# Patient Record
Sex: MALE | Race: WHITE | NOT HISPANIC OR LATINO | Employment: OTHER | URBAN - METROPOLITAN AREA
[De-identification: names, ages, dates, MRNs, and addresses within clinical notes are randomized per-mention and may not be internally consistent; named-entity substitution may affect disease eponyms.]

---

## 2017-01-05 DIAGNOSIS — I10 ESSENTIAL HYPERTENSION: ICD-10-CM

## 2017-01-05 RX ORDER — IRBESARTAN 150 MG/1
TABLET ORAL
Qty: 90 TABLET | OUTPATIENT
Start: 2017-01-05

## 2017-01-25 ENCOUNTER — OFFICE VISIT (OUTPATIENT)
Dept: FAMILY MEDICINE CLINIC | Facility: CLINIC | Age: 67
End: 2017-01-25

## 2017-01-25 VITALS
DIASTOLIC BLOOD PRESSURE: 90 MMHG | SYSTOLIC BLOOD PRESSURE: 140 MMHG | WEIGHT: 221 LBS | OXYGEN SATURATION: 96 % | HEART RATE: 69 BPM | TEMPERATURE: 97.7 F | HEIGHT: 70 IN | BODY MASS INDEX: 31.64 KG/M2

## 2017-01-25 DIAGNOSIS — I10 ESSENTIAL HYPERTENSION: Primary | ICD-10-CM

## 2017-01-25 PROCEDURE — 99213 OFFICE O/P EST LOW 20 MIN: CPT | Performed by: NURSE PRACTITIONER

## 2017-01-25 RX ORDER — IRBESARTAN 150 MG/1
150 TABLET ORAL DAILY
Qty: 90 TABLET | Refills: 1 | Status: SHIPPED | OUTPATIENT
Start: 2017-01-25 | End: 2017-01-25 | Stop reason: SDUPTHER

## 2017-01-25 RX ORDER — IRBESARTAN 150 MG/1
150 TABLET ORAL DAILY
Qty: 30 TABLET | Refills: 0 | Status: SHIPPED | OUTPATIENT
Start: 2017-01-25 | End: 2018-02-19 | Stop reason: SDUPTHER

## 2017-01-25 NOTE — PATIENT INSTRUCTIONS
LDL goal <100  LDL goal if heart disease <70  HDL goal >60  Triglyceride goal <150  BP goal =<130/80  Fasting glucose <100

## 2017-01-25 NOTE — PROGRESS NOTES
Subjective   Daniel Pineda is a 66 y.o. male.     History of Present Illness   Daniel Pineda 66 y.o. male who presents today for routine follow up check and medication refills.  he has a history of   Patient Active Problem List   Diagnosis   • Arthritis of multiple sites   • Positional vertigo   • Allergic rhinitis   • Mild intermittent asthma without complication   • Idiopathic gout   • Essential hypertension   • Hyperlipidemia   • Abnormal glucose   • Urinary frequency   .  Since the last visit, he has overall felt well.  he has been compliant with current meds.  he denies medication side effects.  Patient has not been taking the atorvastatin as he was concerned about possible side effects.  He did not get labs done in October.    The following portions of the patient's history were reviewed and updated as appropriate: allergies, current medications, past family history, past medical history, past social history, past surgical history and problem list.    Review of Systems   Respiratory: Negative for shortness of breath.    Cardiovascular: Negative for chest pain.   Musculoskeletal: Negative for myalgias.       Objective   Physical Exam   Constitutional: He is oriented to person, place, and time. He appears well-developed and well-nourished.   HENT:   Head: Normocephalic and atraumatic.   Neck: Carotid bruit is not present.   Cardiovascular: Normal rate and regular rhythm.    Pulmonary/Chest: Effort normal and breath sounds normal.   Neurological: He is alert and oriented to person, place, and time.   Skin: Skin is warm and dry.   Psychiatric: He has a normal mood and affect. Judgment normal.   Vitals reviewed.      Assessment/Plan   Daniel was seen today for hypertension.    Diagnoses and all orders for this visit:    Essential hypertension  -     Discontinue: irbesartan (AVAPRO) 150 MG tablet; Take 1 tablet by mouth Daily.  -     irbesartan (AVAPRO) 150 MG tablet; Take 1 tablet by mouth Daily.          Reprinted  lab order and gave to patient.  Gave him temp supply of irbesartan to take to local pharmacy while awaiting mail order. Patient will start atorvastatin.

## 2017-01-25 NOTE — MR AVS SNAPSHOT
Daniel Pineda   1/25/2017 1:30 PM   Office Visit    Provider:  MEGHAN Gonzalez   Department:  [facility]   Dept Phone:  [phone]                Your Full Care Plan              Today's Medication Changes          These changes are accurate as of: 1/25/17  1:50 PM.  If you have any questions, ask your nurse or doctor.               Medication(s)that have changed:     irbesartan 150 MG tablet   Commonly known as:  AVAPRO   Take 1 tablet by mouth Daily.   What changed:  See the new instructions.   Changed by:  MEGHAN Gonzalez         Stop taking medication(s)listed here:     ULORIC 80 MG tablet tablet   Generic drug:  febuxostat   Stopped by:  MEGHAN Gonzalez                Where to Get Your Medications      You can get these medications from any pharmacy     Bring a paper prescription for each of these medications     irbesartan 150 MG tablet                  Your Updated Medication List          This list is accurate as of: 1/25/17  1:50 PM.  Always use your most recent med list.                atorvastatin 10 MG tablet   Commonly known as:  LIPITOR   Take 1 tablet by mouth every night for 180 days.       COLCRYS 0.6 MG tablet   Generic drug:  colchicine       fluticasone 50 MCG/ACT nasal spray   Commonly known as:  FLONASE       irbesartan 150 MG tablet   Commonly known as:  AVAPRO   Take 1 tablet by mouth Daily.       SYMBICORT 160-4.5 MCG/ACT inhaler   Generic drug:  budesonide-formoterol               You Were Diagnosed With        Codes Comments    Essential hypertension    -  Primary ICD-10-CM: I10  ICD-9-CM: 401.9       Instructions     None    Patient Instructions History      [employer] Signup     [facility] [employer] allows you to send messages to your doctor, view your test results, renew your prescriptions, schedule appointments, and more. To sign up, go to [employer] and click on the Sign  "Up Now link in the New User? box. Enter your [employer] Activation Code exactly as it appears below along with the last four digits of your Social Security Number and your Date of Birth () to complete the sign-up process. If you do not sign up before the expiration date, you must request a new code.    [employer] Activation Code: [email or web address]  Expires: 2017  1:50 PM    If you have questions, you can email [email or web address] or call [phone] to talk to our [employer] staff. Remember, [employer] is NOT to be used for urgent needs. For medical emergencies, dial 911.               Other Info from Your Visit           Allergies     Penicillins        Reason for Visit     Hypertension med refill, been out  for a mo      Vital Signs     Blood Pressure Pulse Temperature Height Weight Oxygen Saturation    140/90 69 97.7 °F (36.5 °C) 70\" (177.8 cm) 221 lb (100 kg) 96%    Body Mass Index Smoking Status                31.71 kg/m2 Never Smoker          Problems and Diagnoses Noted     High blood pressure      "

## 2018-02-03 ENCOUNTER — OFFICE VISIT (OUTPATIENT)
Dept: RETAIL CLINIC | Facility: CLINIC | Age: 68
End: 2018-02-03

## 2018-02-03 VITALS
OXYGEN SATURATION: 97 % | DIASTOLIC BLOOD PRESSURE: 87 MMHG | TEMPERATURE: 98.3 F | RESPIRATION RATE: 18 BRPM | SYSTOLIC BLOOD PRESSURE: 161 MMHG | HEART RATE: 87 BPM

## 2018-02-03 DIAGNOSIS — N39.0 UTI (URINARY TRACT INFECTION), BACTERIAL: Primary | ICD-10-CM

## 2018-02-03 DIAGNOSIS — A49.9 UTI (URINARY TRACT INFECTION), BACTERIAL: Primary | ICD-10-CM

## 2018-02-03 LAB
BILIRUB BLD-MCNC: ABNORMAL MG/DL
CLARITY, POC: ABNORMAL
COLOR UR: ABNORMAL
GLUCOSE UR STRIP-MCNC: ABNORMAL MG/DL
KETONES UR QL: ABNORMAL
LEUKOCYTE EST, POC: ABNORMAL
NITRITE UR-MCNC: POSITIVE MG/ML
PH UR: 6 [PH] (ref 5–8)
PROT UR STRIP-MCNC: ABNORMAL MG/DL
RBC # UR STRIP: ABNORMAL /UL
SP GR UR: 1.02 (ref 1–1.03)
UROBILINOGEN UR QL: NORMAL

## 2018-02-03 PROCEDURE — 81003 URINALYSIS AUTO W/O SCOPE: CPT | Performed by: NURSE PRACTITIONER

## 2018-02-03 PROCEDURE — 99213 OFFICE O/P EST LOW 20 MIN: CPT | Performed by: NURSE PRACTITIONER

## 2018-02-03 RX ORDER — CIPROFLOXACIN 500 MG/1
500 TABLET, FILM COATED ORAL 2 TIMES DAILY
Qty: 20 TABLET | Refills: 0 | Status: SHIPPED | OUTPATIENT
Start: 2018-02-03 | End: 2018-02-13

## 2018-02-03 NOTE — PROGRESS NOTES
Subjective   Daniel Pineda is a 67 y.o. male.     HPI Comments: Patient reports last night he started with dysuria and noticed blood in his underwear. He has been using the bathroom frequently and it has been burning during and after urination. He denies being sexually active or any chance of STD. Had normal PSA a little less than 2 years ago. No recent rectal exam. Never a smoker. Never had a UTI or kidney stones in past.     Urinary Tract Infection    This is a new problem. The current episode started yesterday. The problem occurs every urination. The problem has been unchanged. The quality of the pain is described as burning and aching. The pain is moderate. The maximum temperature recorded prior to his arrival was 100 - 100.9 F. He is not sexually active. There is no history of pyelonephritis. Associated symptoms include chills, frequency, hematuria, hesitancy and urgency. Pertinent negatives include no discharge, flank pain, nausea, sweats or vomiting. He has tried nothing for the symptoms. There is no history of catheterization, kidney stones, recurrent UTIs, urinary stasis or a urological procedure.       The following portions of the patient's history were reviewed and updated as appropriate: allergies, current medications, past family history, past medical history, past social history, past surgical history and problem list.    Review of Systems   Constitutional: Positive for chills and fever (low grade). Negative for appetite change, diaphoresis and fatigue.   Respiratory: Negative for cough and chest tightness.    Cardiovascular: Negative for chest pain and palpitations.   Gastrointestinal: Negative for abdominal distention, abdominal pain, anal bleeding, blood in stool, constipation, diarrhea, nausea, rectal pain and vomiting.   Genitourinary: Positive for dysuria, enuresis, frequency, hematuria, hesitancy and urgency. Negative for decreased urine volume, discharge, flank pain, genital sores, penile  "pain, penile swelling, scrotal swelling and testicular pain.   Musculoskeletal: Negative for back pain and myalgias.   Skin: Negative for color change.   Neurological: Negative for dizziness and headaches.       Objective   Physical Exam   Constitutional: He is oriented to person, place, and time. He appears well-developed and well-nourished. He is cooperative.  Non-toxic appearance. He does not appear ill. No distress.   Cardiovascular: Normal rate, regular rhythm, S1 normal and S2 normal.    Pulmonary/Chest: Effort normal and breath sounds normal.   Abdominal: Soft. Normal appearance and bowel sounds are normal. There is no tenderness. There is no CVA tenderness.   Neurological: He is alert and oriented to person, place, and time.   Skin: Skin is warm and dry. He is not diaphoretic. No pallor.   Vitals reviewed.      Assessment/Plan   Daniel was seen today for urinary tract infection.    Diagnoses and all orders for this visit:    UTI (urinary tract infection), bacterial  -     Urine Culture - Urine, Urine, Clean Catch  -     POC Urinalysis Dipstick, Automated  -     ciprofloxacin (CIPRO) 500 MG tablet; Take 1 tablet by mouth 2 (Two) Times a Day for 10 days.          -     Discussed with pt our clinic does not preform any \"below the waist\" exams which could be necessary in diagnosing exact cause of symptoms        -     Will call with urine culture results once available        -     F/U with ER for worsening symptoms despite antibiotic use        -     F/U with PCP for persistent symptoms or symptoms that do not completely resolve after taking 10 days of cipro             "

## 2018-02-03 NOTE — PATIENT INSTRUCTIONS
If your symptoms worsen, you need to immediately follow up at the ER. If your symptoms do not improve after taking the antibiotics for 2 days, please notify PCP of symptoms for further management of complaints/symptoms.

## 2018-02-07 LAB
BACTERIA UR CULT: ABNORMAL
BACTERIA UR CULT: ABNORMAL
OTHER ANTIBIOTIC SUSC ISLT: ABNORMAL

## 2018-02-08 ENCOUNTER — TELEPHONE (OUTPATIENT)
Dept: RETAIL CLINIC | Facility: CLINIC | Age: 68
End: 2018-02-08

## 2018-02-08 NOTE — TELEPHONE ENCOUNTER
Spoke with patient to inform him of urine culture results. He reports almost complete resolution of symptoms. He was instructed to complete cipro until bottle is empty and to follow up with PCP if any symptoms persist. He verbalized understanding and didn't have any additional questions or concerns at this time.

## 2018-02-19 ENCOUNTER — OFFICE VISIT (OUTPATIENT)
Dept: FAMILY MEDICINE CLINIC | Facility: CLINIC | Age: 68
End: 2018-02-19

## 2018-02-19 VITALS
DIASTOLIC BLOOD PRESSURE: 86 MMHG | SYSTOLIC BLOOD PRESSURE: 152 MMHG | WEIGHT: 224 LBS | HEART RATE: 72 BPM | BODY MASS INDEX: 32.07 KG/M2 | HEIGHT: 70 IN | TEMPERATURE: 97 F | RESPIRATION RATE: 16 BRPM

## 2018-02-19 DIAGNOSIS — I10 ESSENTIAL HYPERTENSION: ICD-10-CM

## 2018-02-19 DIAGNOSIS — E11.65 UNCONTROLLED TYPE 2 DIABETES MELLITUS WITH HYPERGLYCEMIA, WITHOUT LONG-TERM CURRENT USE OF INSULIN (HCC): ICD-10-CM

## 2018-02-19 DIAGNOSIS — E78.2 MIXED HYPERLIPIDEMIA: ICD-10-CM

## 2018-02-19 DIAGNOSIS — I10 ESSENTIAL HYPERTENSION: Primary | ICD-10-CM

## 2018-02-19 DIAGNOSIS — R73.09 ABNORMAL GLUCOSE: ICD-10-CM

## 2018-02-19 PROBLEM — E55.9 VITAMIN D DEFICIENCY: Status: ACTIVE | Noted: 2018-02-19

## 2018-02-19 LAB — GLUCOSE BLDC GLUCOMTR-MCNC: 146 MG/DL (ref 70–130)

## 2018-02-19 PROCEDURE — 82962 GLUCOSE BLOOD TEST: CPT | Performed by: FAMILY MEDICINE

## 2018-02-19 PROCEDURE — 99214 OFFICE O/P EST MOD 30 MIN: CPT | Performed by: FAMILY MEDICINE

## 2018-02-19 RX ORDER — IRBESARTAN 150 MG/1
TABLET ORAL
Qty: 30 TABLET | OUTPATIENT
Start: 2018-02-19

## 2018-02-19 RX ORDER — ATORVASTATIN CALCIUM 10 MG/1
10 TABLET, FILM COATED ORAL NIGHTLY
Qty: 90 TABLET | Refills: 1 | Status: SHIPPED | OUTPATIENT
Start: 2018-02-19 | End: 2018-07-30 | Stop reason: SDUPTHER

## 2018-02-19 RX ORDER — IRBESARTAN 150 MG/1
150 TABLET ORAL DAILY
Qty: 90 TABLET | Refills: 1 | Status: SHIPPED | OUTPATIENT
Start: 2018-02-19 | End: 2018-02-19 | Stop reason: SDUPTHER

## 2018-02-19 RX ORDER — IRBESARTAN 150 MG/1
150 TABLET ORAL DAILY
Qty: 90 TABLET | Refills: 1 | Status: SHIPPED | OUTPATIENT
Start: 2018-02-19 | End: 2018-07-30 | Stop reason: SDUPTHER

## 2018-02-19 RX ORDER — IRBESARTAN 150 MG/1
150 TABLET ORAL DAILY
Qty: 30 TABLET | Refills: 5 | Status: SHIPPED | OUTPATIENT
Start: 2018-02-19 | End: 2018-02-19 | Stop reason: SDUPTHER

## 2018-02-19 NOTE — ASSESSMENT & PLAN NOTE
Diabetes is newly identified.   new medication, cancel upcoming surgery until sugar is better controlled.   Diabetes will be reassessed in 3 weeks.

## 2018-02-19 NOTE — PROGRESS NOTES
"Chief Complaint   Patient presents with   • Results     review labs   • Hypertension       Subjective   This patient is here this morning to follow-up on phone call from his orthopedist.  He had some preoperative testing done for upcoming knee surgery this next Tuesday.  Apparently labs drawn last Thursday showed elevation of hemoglobin A1c to 18.5.  Non Fasting blood sugar was 150.  The patient feels fine.  His only symptoms from elevation of sugar are mildly increased urine frequency.  Nocturia is 0-1 time per night.  He denies any visual changes or blurriness. Today he is fasting.   I have reviewed and updated his medications, medical history and problem list during today's office visit.       Assessment/Plan   Problem List Items Addressed This Visit        Cardiovascular and Mediastinum    Essential hypertension - Primary    Relevant Medications    irbesartan (AVAPRO) 150 MG tablet    Mixed hyperlipidemia    Relevant Medications    atorvastatin (LIPITOR) 10 MG tablet       Endocrine    Uncontrolled type 2 diabetes mellitus with hyperglycemia, without long-term current use of insulin     Diabetes is newly identified.   new medication, cancel upcoming surgery until sugar is better controlled.   Diabetes will be reassessed in 3 weeks.         Relevant Medications    Empagliflozin 10 MG tablet    Other Relevant Orders    Hemoglobin A1c    BMP8+eGFR ([facility])        F/U in three weeks       Review of Systems   Constitutional: Negative for fatigue.   Eyes: Negative for visual disturbance.   Cardiovascular: Negative for chest pain.   Genitourinary: Positive for frequency (nocturia x1).   Neurological: Negative for headaches.     Objective   /86 (BP Location: Right arm, Patient Position: Sitting, Cuff Size: Adult)  Pulse 72  Temp 97 °F (36.1 °C) (Oral)   Resp 16  Ht 177.8 cm (70\")  Wt 102 kg (224 lb)  BMI 32.14 kg/m2  Body mass index is 32.14 kg/(m^2).  Physical Exam   Constitutional: He is cooperative. No " distress.   Eyes: Conjunctivae and lids are normal.   Neck: Carotid bruit is not present. No tracheal deviation present.   Cardiovascular: Normal rate, regular rhythm and normal heart sounds.    No murmur heard.  Pulmonary/Chest: Effort normal and breath sounds normal.   Neurological: He is alert. He is not disoriented.   Skin: Skin is warm and dry.   Psychiatric: He has a normal mood and affect. His speech is normal and behavior is normal.   Vitals reviewed.       Social History   Substance Use Topics   • Smoking status: Never Smoker   • Smokeless tobacco: Never Used   • Alcohol use Yes       Data Reviewed:  HBA1c was !18, non fasting bs 150  Lab Results   Component Value Date    POCGLU 146 (A) 02/19/2018         Orders Placed This Encounter   Procedures   • Hemoglobin A1c     Standing Status:   Future   • BMP8+eGFR ([facility])     Standing Status:   Future   • POC Glucose       Outpatient Encounter Prescriptions as of 2/19/2018   Medication Sig Dispense Refill   • ALLOPURINOL PO Take  by mouth.     • fluticasone (FLONASE) 50 MCG/ACT nasal spray      • irbesartan (AVAPRO) 150 MG tablet Take 1 tablet by mouth Daily for 180 days. 30 tablet 5   • SYMBICORT 160-4.5 MCG/ACT inhaler      • [DISCONTINUED] irbesartan (AVAPRO) 150 MG tablet Take 1 tablet by mouth Daily. 30 tablet 0   • atorvastatin (LIPITOR) 10 MG tablet Take 1 tablet by mouth Every Night for 180 days. 90 tablet 1   • Empagliflozin 10 MG tablet Take 1 tablet by mouth Every Morning Before Breakfast for 180 days. 30 tablet 5   • [DISCONTINUED] atorvastatin (LIPITOR) 10 MG tablet Take 1 tablet by mouth every night for 180 days. 90 tablet 1   • [DISCONTINUED] COLCRYS 0.6 MG tablet        No facility-administered encounter medications on file as of 2/19/2018.

## 2018-03-05 ENCOUNTER — RESULTS ENCOUNTER (OUTPATIENT)
Dept: FAMILY MEDICINE CLINIC | Facility: CLINIC | Age: 68
End: 2018-03-05

## 2018-03-05 DIAGNOSIS — E11.65 UNCONTROLLED TYPE 2 DIABETES MELLITUS WITH HYPERGLYCEMIA, WITHOUT LONG-TERM CURRENT USE OF INSULIN (HCC): ICD-10-CM

## 2018-03-08 LAB
BUN SERPL-MCNC: 27 MG/DL (ref 8–23)
BUN/CREAT SERPL: 24.3 (ref 7–25)
CALCIUM SERPL-MCNC: 9.3 MG/DL (ref 8.6–10.5)
CHLORIDE SERPL-SCNC: 105 MMOL/L (ref 98–107)
CO2 SERPL-SCNC: 21.1 MMOL/L (ref 22–29)
CREAT SERPL-MCNC: 1.11 MG/DL (ref 0.76–1.27)
GFR SERPLBLD CREATININE-BSD FMLA CKD-EPI: 66 ML/MIN/1.73
GFR SERPLBLD CREATININE-BSD FMLA CKD-EPI: 80 ML/MIN/1.73
GLUCOSE SERPL-MCNC: 90 MG/DL (ref 65–99)
HBA1C MFR BLD: 5.7 % (ref 4.8–5.6)
POTASSIUM SERPL-SCNC: 4.6 MMOL/L (ref 3.5–5.2)
SODIUM SERPL-SCNC: 143 MMOL/L (ref 136–145)

## 2018-03-12 ENCOUNTER — OFFICE VISIT (OUTPATIENT)
Dept: FAMILY MEDICINE CLINIC | Facility: CLINIC | Age: 68
End: 2018-03-12

## 2018-03-12 VITALS
HEART RATE: 70 BPM | HEIGHT: 70 IN | SYSTOLIC BLOOD PRESSURE: 123 MMHG | WEIGHT: 212 LBS | DIASTOLIC BLOOD PRESSURE: 82 MMHG | RESPIRATION RATE: 16 BRPM | BODY MASS INDEX: 30.35 KG/M2 | TEMPERATURE: 96.8 F

## 2018-03-12 DIAGNOSIS — E11.9 CONTROLLED TYPE 2 DIABETES MELLITUS WITHOUT COMPLICATION, WITHOUT LONG-TERM CURRENT USE OF INSULIN (HCC): Primary | ICD-10-CM

## 2018-03-12 DIAGNOSIS — I10 ESSENTIAL HYPERTENSION: ICD-10-CM

## 2018-03-12 DIAGNOSIS — E78.2 MIXED HYPERLIPIDEMIA: ICD-10-CM

## 2018-03-12 DIAGNOSIS — E11.65 UNCONTROLLED TYPE 2 DIABETES MELLITUS WITH HYPERGLYCEMIA, WITHOUT LONG-TERM CURRENT USE OF INSULIN (HCC): ICD-10-CM

## 2018-03-12 PROCEDURE — 99213 OFFICE O/P EST LOW 20 MIN: CPT | Performed by: FAMILY MEDICINE

## 2018-03-12 NOTE — PROGRESS NOTES
"Chief Complaint   Patient presents with   • Hypertension       Subjective   Daniel Pineda presents to the office today to refill his medications and review recent labs. No medication side effects are reported.  Pressure is controlled.  Labs show good control of his diabetes with Jardiance.  The medicine does have a side effect of increased urination.  There has been interval weight loss.  He is cleared for his upcoming knee surgery and he will reschedule.  Note has been sent to specialist regarding this.    I have reviewed and updated his medications, medical history and problem list during today's office visit.     Assessment/Plan   Problem List Items Addressed This Visit        Cardiovascular and Mediastinum    Essential hypertension    Mixed hyperlipidemia    Relevant Orders    Lipid Panel With LDL/HDL Ratio       Endocrine    Controlled type 2 diabetes mellitus without complication, without long-term current use of insulin - Primary    Relevant Medications    Empagliflozin 10 MG tablet    Other Relevant Orders    Hemoglobin A1c    MicroAlbumin, Urine, Random - Urine, Clean Catch      Other Visit Diagnoses     Uncontrolled type 2 diabetes mellitus with hyperglycemia, without long-term current use of insulin        Relevant Medications    Empagliflozin 10 MG tablet    Other Relevant Orders    Comprehensive metabolic panel    CBC and Differential    TSH        F/U in 5 months       Review of Systems   Constitutional: Negative for fatigue.   Cardiovascular: Negative for chest pain.     Objective   /82   Pulse 70   Temp 96.8 °F (36 °C) (Oral)   Resp 16   Ht 177.8 cm (70\")   Wt 96.2 kg (212 lb)   BMI 30.42 kg/m²   Body mass index is 30.42 kg/m².  Physical Exam   Constitutional: He is cooperative. No distress.   Cardiovascular: Normal rate, regular rhythm and normal heart sounds.    No murmur heard.  Pulmonary/Chest: Effort normal and breath sounds normal.   Neurological: He is alert. He is not " disoriented.   Psychiatric: He has a normal mood and affect. His speech is normal and behavior is normal.   Vitals reviewed.       Social History   Substance Use Topics   • Smoking status: Never Smoker   • Smokeless tobacco: Never Used   • Alcohol use Yes       Data Reviewed:    No radiology results for the last 30 days.    JWAMBULATORYLABS:   Lab Results   Component Value Date    GLU 90 03/08/2018    BUN 27 (H) 03/08/2018    CREATININE 1.11 03/08/2018    EGFRIFNONA 66 03/08/2018    EGFRIFAFRI 80 03/08/2018     03/08/2018    K 4.6 03/08/2018     03/08/2018    CALCIUM 9.3 03/08/2018    PROTENTOTREF 6.3 04/21/2016    ALBUMIN 4.20 04/21/2016    LABGLOBREF 2.1 04/21/2016    BILITOT 0.5 04/21/2016    ALKPHOS 82 04/21/2016    AST 22 04/21/2016    ALT 31 04/21/2016     CBC w/ diff:   Lab Results   Component Value Date    WBC 6.97 04/21/2016    RBC 4.97 04/21/2016    HGB 15.2 04/21/2016    HCT 47.2 04/21/2016    MCV 95.0 04/21/2016    MCH 30.6 04/21/2016    MCHC 32.2 (L) 04/21/2016    RDW 12.5 04/21/2016     04/21/2016    NEUTRORELPCT 45.2 04/21/2016    LYMPHORELPCT 38.2 04/21/2016    MONORELPCT 10.3 04/21/2016    EOSRELPCT 4.2 04/21/2016    BASORELPCT 1.1 04/21/2016     LIPID PANEL:  Lab Results   Component Value Date    CHLPL 238 (H) 04/21/2016    TRIG 242 (H) 04/21/2016    HDL 36 (L) 04/21/2016    VLDL 48.4 (H) 04/21/2016     (H) 04/21/2016    LDLHDL 4.27 04/21/2016     HGBA1C (LAST 3):  Lab Results   Component Value Date    HGBA1C 5.70 (H) 03/08/2018     MICROALBUMIN SPOT URINE:No results found for: MICROALBUR    Orders Placed This Encounter   Procedures   • Comprehensive metabolic panel     Standing Status:   Future     Standing Expiration Date:   12/7/2018   • Lipid Panel With LDL/HDL Ratio     Standing Status:   Future     Standing Expiration Date:   12/7/2018   • TSH     Standing Status:   Future     Standing Expiration Date:   12/7/2018   • Hemoglobin A1c     Standing Status:   Future      Standing Expiration Date:   12/7/2018   • MicroAlbumin, Urine, Random - Urine, Clean Catch     Standing Status:   Future     Standing Expiration Date:   12/7/2018   • CBC and Differential     Standing Status:   Future     Standing Expiration Date:   12/7/2018     Order Specific Question:   Manual Differential     Answer:   No       Outpatient Encounter Prescriptions as of 3/12/2018   Medication Sig Dispense Refill   • ALLOPURINOL PO Take  by mouth.     • atorvastatin (LIPITOR) 10 MG tablet Take 1 tablet by mouth Every Night for 180 days. 90 tablet 1   • Empagliflozin 10 MG tablet Take 1 tablet by mouth Every Morning Before Breakfast for 180 days. 90 tablet 1   • fluticasone (FLONASE) 50 MCG/ACT nasal spray      • irbesartan (AVAPRO) 150 MG tablet Take 1 tablet by mouth Daily for 180 days. 90 tablet 1   • SYMBICORT 160-4.5 MCG/ACT inhaler      • [DISCONTINUED] Empagliflozin 10 MG tablet Take 1 tablet by mouth Every Morning Before Breakfast for 180 days. 30 tablet 5     No facility-administered encounter medications on file as of 3/12/2018.

## 2018-04-16 ENCOUNTER — OFFICE VISIT (OUTPATIENT)
Dept: FAMILY MEDICINE CLINIC | Facility: CLINIC | Age: 68
End: 2018-04-16

## 2018-04-16 VITALS
WEIGHT: 208 LBS | HEART RATE: 63 BPM | RESPIRATION RATE: 16 BRPM | BODY MASS INDEX: 29.78 KG/M2 | SYSTOLIC BLOOD PRESSURE: 162 MMHG | HEIGHT: 70 IN | DIASTOLIC BLOOD PRESSURE: 81 MMHG

## 2018-04-16 DIAGNOSIS — R73.09 ELEVATED HEMOGLOBIN A1C: Primary | ICD-10-CM

## 2018-04-16 DIAGNOSIS — E11.9 CONTROLLED TYPE 2 DIABETES MELLITUS WITHOUT COMPLICATION, WITHOUT LONG-TERM CURRENT USE OF INSULIN (HCC): ICD-10-CM

## 2018-04-16 LAB
GLUCOSE BLDC GLUCOMTR-MCNC: 117 MG/DL (ref 70–130)
HBA1C MFR BLD: 5.59 % (ref 4.8–5.6)

## 2018-04-16 PROCEDURE — 82962 GLUCOSE BLOOD TEST: CPT | Performed by: FAMILY MEDICINE

## 2018-04-16 PROCEDURE — 99213 OFFICE O/P EST LOW 20 MIN: CPT | Performed by: FAMILY MEDICINE

## 2018-04-16 NOTE — PROGRESS NOTES
"Chief Complaint   Patient presents with   • Results     A1C       Subjective   This patient presents the office to follow-up on recent marked abnormal hemoglobin A1c test result.  Fridays test results showed hemoglobin A1c of greater than 18.  His most recent hemoglobin A1c prior to that was 5.7.  He is feeling normal.  Today's fasting blood sugar was 117.  We will repeat.  If hemoglobin A1c is still marked elevated then we will refer him to endocrinology.  I have reviewed and updated his medications, medical history and problem list during today's office visit.       Social History   Substance Use Topics   • Smoking status: Never Smoker   • Smokeless tobacco: Never Used   • Alcohol use Yes     Review of Systems   Constitutional: Negative for unexpected weight loss.   Endocrine: Positive for polydipsia. Negative for polyuria.     Objective   /81   Pulse 63   Resp 16   Ht 177.8 cm (70\")   Wt 94.3 kg (208 lb)   BMI 29.84 kg/m²   Body mass index is 29.84 kg/m².  Physical Exam   Constitutional: He is oriented to person, place, and time. No distress.   Cardiovascular: Normal rate and normal heart sounds.    Pulmonary/Chest: Effort normal and breath sounds normal.   Neurological: He is alert and oriented to person, place, and time.   Skin: He is not diaphoretic.   Psychiatric: He has a normal mood and affect. His speech is normal. He is attentive.   Vitals reviewed.       Data Reviewed:    POCT Glucose   Order: [id number]   Status:  Final result   Visible to patient:  No (Not Released) Dx:  Elevated hemoglobin A1c     Ref Range & Units 08:53  (4/16/18) 1mo ago  (3/8/18) 1mo ago  (2/19/18)    Glucose 70 - 130 mg/dL 117  90R  146     [facility]                  HGBA1C (LAST 3):  Lab Results   Component Value Date    HGBA1C 5.70 (H) 03/08/2018       Assessment/Plan   Problem List Items Addressed This Visit        Endocrine    Controlled type 2 diabetes mellitus without complication, without " long-term current use of insulin      Other Visit Diagnoses     Elevated hemoglobin A1c    -  Primary    Relevant Orders    POCT Glucose (Completed)    Hemoglobin A1c  If it is still elevated, referral to endocrinology        Orders Placed This Encounter   Procedures   • Hemoglobin A1c   • POCT Glucose     Next appointment in august.

## 2018-04-19 ENCOUNTER — TELEPHONE (OUTPATIENT)
Dept: FAMILY MEDICINE CLINIC | Facility: CLINIC | Age: 68
End: 2018-04-19

## 2018-04-19 DIAGNOSIS — E11.9 CONTROLLED TYPE 2 DIABETES MELLITUS WITHOUT COMPLICATION, WITHOUT LONG-TERM CURRENT USE OF INSULIN (HCC): ICD-10-CM

## 2018-04-19 DIAGNOSIS — E11.9 CONTROLLED TYPE 2 DIABETES MELLITUS WITHOUT COMPLICATION, WITHOUT LONG-TERM CURRENT USE OF INSULIN (HCC): Primary | ICD-10-CM

## 2018-04-19 RX ORDER — BLOOD GLUCOSE CNTL HIGH,NORMAL
EACH MISCELLANEOUS
Qty: 3 EACH | Refills: 3 | Status: SHIPPED | OUTPATIENT
Start: 2018-04-19 | End: 2018-07-30

## 2018-04-19 RX ORDER — ISOPROPYL ALCOHOL 0.75 G/1
SWAB TOPICAL
Qty: 100 EACH | Refills: 3 | Status: SHIPPED | OUTPATIENT
Start: 2018-04-19 | End: 2018-04-19 | Stop reason: SDUPTHER

## 2018-04-19 RX ORDER — LANCETS
EACH MISCELLANEOUS
Qty: 100 EACH | Refills: 3 | Status: SHIPPED | OUTPATIENT
Start: 2018-04-19 | End: 2018-04-19 | Stop reason: SDUPTHER

## 2018-04-19 RX ORDER — LANCETS
EACH MISCELLANEOUS
Qty: 100 EACH | Refills: 3 | Status: SHIPPED | OUTPATIENT
Start: 2018-04-19 | End: 2018-07-30

## 2018-04-19 RX ORDER — BLOOD-GLUCOSE METER
EACH MISCELLANEOUS
Qty: 1 KIT | Refills: 0 | Status: SHIPPED | OUTPATIENT
Start: 2018-04-19 | End: 2018-04-19 | Stop reason: SDUPTHER

## 2018-04-19 RX ORDER — BLOOD-GLUCOSE METER
EACH MISCELLANEOUS
Qty: 1 KIT | Refills: 0 | Status: SHIPPED | OUTPATIENT
Start: 2018-04-19 | End: 2018-07-30

## 2018-04-19 RX ORDER — ISOPROPYL ALCOHOL 0.75 G/1
SWAB TOPICAL
Qty: 100 EACH | Refills: 3 | Status: SHIPPED | OUTPATIENT
Start: 2018-04-19 | End: 2018-07-30

## 2018-04-19 RX ORDER — BLOOD GLUCOSE CNTL HIGH,NORMAL
EACH MISCELLANEOUS
Qty: 3 EACH | Refills: 3 | Status: SHIPPED | OUTPATIENT
Start: 2018-04-19 | End: 2018-04-19 | Stop reason: SDUPTHER

## 2018-05-09 DIAGNOSIS — I10 ESSENTIAL HYPERTENSION: ICD-10-CM

## 2018-05-11 RX ORDER — IRBESARTAN 150 MG/1
TABLET ORAL
Qty: 90 TABLET | Refills: 1 | OUTPATIENT
Start: 2018-05-11

## 2018-05-18 DIAGNOSIS — I10 ESSENTIAL HYPERTENSION: ICD-10-CM

## 2018-05-18 RX ORDER — IRBESARTAN 150 MG/1
TABLET ORAL
Qty: 90 TABLET | OUTPATIENT
Start: 2018-05-18

## 2018-07-30 ENCOUNTER — OFFICE VISIT (OUTPATIENT)
Dept: FAMILY MEDICINE CLINIC | Facility: CLINIC | Age: 68
End: 2018-07-30

## 2018-07-30 VITALS
RESPIRATION RATE: 16 BRPM | HEIGHT: 70 IN | TEMPERATURE: 97.7 F | SYSTOLIC BLOOD PRESSURE: 132 MMHG | HEART RATE: 68 BPM | DIASTOLIC BLOOD PRESSURE: 78 MMHG | BODY MASS INDEX: 27.35 KG/M2 | WEIGHT: 191 LBS

## 2018-07-30 DIAGNOSIS — I10 ESSENTIAL HYPERTENSION: Primary | ICD-10-CM

## 2018-07-30 DIAGNOSIS — R73.01 IMPAIRED FASTING GLUCOSE: ICD-10-CM

## 2018-07-30 DIAGNOSIS — E78.2 MIXED HYPERLIPIDEMIA: ICD-10-CM

## 2018-07-30 PROCEDURE — 99214 OFFICE O/P EST MOD 30 MIN: CPT | Performed by: FAMILY MEDICINE

## 2018-07-30 RX ORDER — ATORVASTATIN CALCIUM 10 MG/1
10 TABLET, FILM COATED ORAL NIGHTLY
Qty: 90 TABLET | Refills: 1 | Status: SHIPPED | OUTPATIENT
Start: 2018-07-30 | End: 2019-02-27 | Stop reason: SDUPTHER

## 2018-07-30 RX ORDER — IRBESARTAN 150 MG/1
150 TABLET ORAL DAILY
Qty: 90 TABLET | Refills: 1 | Status: SHIPPED | OUTPATIENT
Start: 2018-07-30 | End: 2019-02-27 | Stop reason: ALTCHOICE

## 2018-07-30 NOTE — PROGRESS NOTES
"Chief Complaint   Patient presents with   • Hypertension   • Hyperlipidemia       Subjective     Daniel Pineda presents to the office today to refill his medications and review recent labs. No medication side effects are reported.  His blood pressure is controlled on current therapy.  Lipids are stable on current therapy.  Since last visit his diagnosis of diabetes has been refuted.  He has a condition called  hemoglobin Morales 2 trait.  He remains under the care of endocrinology.  He will have them forward the report.  Outside labs have been reviewed.  Overall he has had some weight loss and is feeling better.  We will continue with current therapy.    I have reviewed and updated his medications, medical history and problem list during today's office visit.      Patient Care Team:  Basilio Rascon MD as PCP - General (Family Medicine)  Abby Read MD as Consulting Physician (Rheumatology)  Andre Chandler MD as Consulting Physician (Allergy and Immunology)    Social History   Substance Use Topics   • Smoking status: Never Smoker   • Smokeless tobacco: Never Used   • Alcohol use Yes       Review of Systems   Constitutional: Negative for fatigue.   Cardiovascular: Negative for chest pain.       Objective     /78   Pulse 68   Temp 97.7 °F (36.5 °C) (Oral)   Resp 16   Ht 177.8 cm (70\")   Wt 86.6 kg (191 lb)   BMI 27.41 kg/m²     Body mass index is 27.41 kg/m².    Physical Exam   Constitutional: He is oriented to person, place, and time. No distress.   Cardiovascular: Normal rate and normal heart sounds.    Pulmonary/Chest: Effort normal and breath sounds normal.   Neurological: He is alert and oriented to person, place, and time.   Skin: He is not diaphoretic.   Psychiatric: He has a normal mood and affect. His speech is normal. He is attentive.   Vitals reviewed.      Data Reviewed:             Assessment/Plan     Problem List Items Addressed This Visit     Essential hypertension - Primary    " Relevant Medications    irbesartan (AVAPRO) 150 MG tablet    Mixed hyperlipidemia    Relevant Medications    atorvastatin (LIPITOR) 10 MG tablet    Impaired fasting glucose          No orders of the defined types were placed in this encounter.        Current Outpatient Prescriptions:   •  ALLOPURINOL PO, Take  by mouth., Disp: , Rfl:   •  atorvastatin (LIPITOR) 10 MG tablet, Take 1 tablet by mouth Every Night for 180 days., Disp: 90 tablet, Rfl: 1  •  fluticasone (FLONASE) 50 MCG/ACT nasal spray, , Disp: , Rfl:   •  glucose blood test strip, Use as instructed to test blood sugar once daily DX: E11.9, Disp: 100 each, Rfl: 3  •  irbesartan (AVAPRO) 150 MG tablet, Take 1 tablet by mouth Daily for 180 days., Disp: 90 tablet, Rfl: 1  •  SYMBICORT 160-4.5 MCG/ACT inhaler, , Disp: , Rfl:     Return in about 6 months (around 1/30/2019) for Recheck.

## 2018-08-09 ENCOUNTER — RESULTS ENCOUNTER (OUTPATIENT)
Dept: FAMILY MEDICINE CLINIC | Facility: CLINIC | Age: 68
End: 2018-08-09

## 2018-08-09 DIAGNOSIS — E78.2 MIXED HYPERLIPIDEMIA: ICD-10-CM

## 2018-08-09 DIAGNOSIS — E11.65 UNCONTROLLED TYPE 2 DIABETES MELLITUS WITH HYPERGLYCEMIA, WITHOUT LONG-TERM CURRENT USE OF INSULIN (HCC): ICD-10-CM

## 2018-08-09 DIAGNOSIS — E11.9 CONTROLLED TYPE 2 DIABETES MELLITUS WITHOUT COMPLICATION, WITHOUT LONG-TERM CURRENT USE OF INSULIN (HCC): ICD-10-CM

## 2019-02-27 ENCOUNTER — OFFICE VISIT (OUTPATIENT)
Dept: FAMILY MEDICINE CLINIC | Facility: CLINIC | Age: 69
End: 2019-02-27

## 2019-02-27 VITALS
RESPIRATION RATE: 16 BRPM | HEART RATE: 74 BPM | BODY MASS INDEX: 30.21 KG/M2 | DIASTOLIC BLOOD PRESSURE: 75 MMHG | SYSTOLIC BLOOD PRESSURE: 110 MMHG | HEIGHT: 70 IN | TEMPERATURE: 96.6 F | WEIGHT: 211 LBS

## 2019-02-27 DIAGNOSIS — G83.89: ICD-10-CM

## 2019-02-27 DIAGNOSIS — E78.2 MIXED HYPERLIPIDEMIA: ICD-10-CM

## 2019-02-27 DIAGNOSIS — R73.01 IMPAIRED FASTING GLUCOSE: ICD-10-CM

## 2019-02-27 DIAGNOSIS — I10 ESSENTIAL HYPERTENSION: Primary | ICD-10-CM

## 2019-02-27 PROCEDURE — 99214 OFFICE O/P EST MOD 30 MIN: CPT | Performed by: FAMILY MEDICINE

## 2019-02-27 RX ORDER — TELMISARTAN 40 MG/1
40 TABLET ORAL DAILY
Qty: 90 TABLET | Refills: 1 | Status: SHIPPED | OUTPATIENT
Start: 2019-02-27 | End: 2019-09-03 | Stop reason: SDUPTHER

## 2019-02-27 RX ORDER — ATORVASTATIN CALCIUM 10 MG/1
10 TABLET, FILM COATED ORAL NIGHTLY
Qty: 90 TABLET | Refills: 1 | Status: SHIPPED | OUTPATIENT
Start: 2019-02-27 | End: 2019-09-03 | Stop reason: SDDI

## 2019-02-27 NOTE — ASSESSMENT & PLAN NOTE
Hypertension is unchanged.  Medication changes per orders. Change to Telmisartan 40 mg daily due to recall of Irbesartan.    Blood pressure will be reassessed in 2 months.

## 2019-02-27 NOTE — ASSESSMENT & PLAN NOTE
Lipid abnormalities are unchanged.  Pharmacotherapy as ordered.  Labs due  Lipids will be reassessed in 6 months.

## 2019-02-27 NOTE — PROGRESS NOTES
"Chief Complaint   Patient presents with   • Hyperlipidemia   • Hypertension   • Gout       Subjective     Daniel Pineda presents to the office today to refill his medications and review recent labs. No medication side effects are reported.  Blood pressure is controlled.  Unfortunately we will need to switch medication due to FDA recall of Irbesartan.  Lipids are stable pending lab results.  Impaired fasting glucose is stable pending lab results.  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  Most recent hemoglobin A1c was within normal limits from [facility].  (Dated May 16, 2018)  Patient has been erroneously marked as diabetic. Based on the available clinical information, he does not have diabetes and should therefore be excluded from diabetic health maintenance and quality measures for the remainder of the reporting period.    I have reviewed and updated his medications, medical history and problem list during today's office visit.      Patient Care Team:  Basilio Rascon MD as PCP - General (Family Medicine)  Jacek, Abby Mijares MD as Consulting Physician (Rheumatology)  Andre Chandler MD as Consulting Physician (Allergy and Immunology)  Rashad Anderson/MD Omer as Surgeon (Orthopedic Surgery)    Social History     Tobacco Use   • Smoking status: Never Smoker   • Smokeless tobacco: Never Used   Substance Use Topics   • Alcohol use: Yes       Review of Systems   Constitutional: Negative for fatigue.   Cardiovascular: Negative for chest pain.       Objective     /75   Pulse 74   Temp 96.6 °F (35.9 °C) (Oral)   Resp 16   Ht 177.8 cm (70\")   Wt 95.7 kg (211 lb)   BMI 30.28 kg/m²     Body mass index is 30.28 kg/m².    Physical Exam   Constitutional: He is oriented to person, place, and time. He appears well-developed. No distress.   Eyes: Conjunctivae and lids are normal.   Neck: Carotid bruit is not present.   Cardiovascular: Normal rate, " regular rhythm and normal heart sounds.   Pulmonary/Chest: Effort normal and breath sounds normal.   Neurological: He is alert and oriented to person, place, and time.   Skin: Skin is warm and dry.   Psychiatric: He has a normal mood and affect. His behavior is normal.   Vitals reviewed.      Data Reviewed:                    Assessment/Plan     Problem List Items Addressed This Visit     Essential hypertension - Primary     Hypertension is unchanged.  Medication changes per orders. Change to Telmisartan 40 mg daily due to recall of Irbesartan.    Blood pressure will be reassessed in 2 months.         Relevant Medications    telmisartan (MICARDIS) 40 MG tablet    Mixed hyperlipidemia     Lipid abnormalities are unchanged.  Pharmacotherapy as ordered.  Labs due  Lipids will be reassessed in 6 months.         Relevant Medications    atorvastatin (LIPITOR) 10 MG tablet    Impaired fasting glucose     Stable pending lab results.         Morales Syndrome (CMS/[facility])     (new problem to this provider)  Dx of diabetes removed from [facility]               No orders of the defined types were placed in this encounter.        Current Outpatient Medications:   •  ALLOPURINOL PO, Take 300 mg by mouth Daily., Disp: , Rfl:   •  fluticasone (FLONASE) 50 MCG/ACT nasal spray, , Disp: , Rfl:   •  glucose blood test strip, Use as instructed to test blood sugar once daily DX: E11.9, Disp: 100 each, Rfl: 3  •  SYMBICORT 160-4.5 MCG/ACT inhaler, , Disp: , Rfl:   •  atorvastatin (LIPITOR) 10 MG tablet, Take 1 tablet by mouth Every Night for 180 days., Disp: 90 tablet, Rfl: 1  •  telmisartan (MICARDIS) 40 MG tablet, Take 1 tablet by mouth Daily for 180 days., Disp: 90 tablet, Rfl: 1    Return in about 6 months (around 8/27/2019) for Medicare Wellness and regular visit, 30 minutes.

## 2019-09-03 ENCOUNTER — OFFICE VISIT (OUTPATIENT)
Dept: FAMILY MEDICINE CLINIC | Facility: CLINIC | Age: 69
End: 2019-09-03

## 2019-09-03 VITALS
RESPIRATION RATE: 16 BRPM | HEIGHT: 70 IN | OXYGEN SATURATION: 98 % | DIASTOLIC BLOOD PRESSURE: 74 MMHG | HEART RATE: 71 BPM | SYSTOLIC BLOOD PRESSURE: 130 MMHG | WEIGHT: 212 LBS | TEMPERATURE: 98.1 F | BODY MASS INDEX: 30.35 KG/M2

## 2019-09-03 DIAGNOSIS — Z12.5 ENCOUNTER FOR SCREENING FOR MALIGNANT NEOPLASM OF PROSTATE: ICD-10-CM

## 2019-09-03 DIAGNOSIS — I10 ESSENTIAL HYPERTENSION: ICD-10-CM

## 2019-09-03 DIAGNOSIS — E78.2 MIXED HYPERLIPIDEMIA: Primary | ICD-10-CM

## 2019-09-03 DIAGNOSIS — R73.01 IMPAIRED FASTING GLUCOSE: ICD-10-CM

## 2019-09-03 PROCEDURE — 99213 OFFICE O/P EST LOW 20 MIN: CPT | Performed by: NURSE PRACTITIONER

## 2019-09-03 RX ORDER — TELMISARTAN 40 MG/1
40 TABLET ORAL DAILY
Qty: 90 TABLET | Refills: 1 | Status: SHIPPED | OUTPATIENT
Start: 2019-09-03 | End: 2020-01-29 | Stop reason: HOSPADM

## 2019-09-03 NOTE — PROGRESS NOTES
Subjective   Daniel Pineda is a 68 y.o. male.     History of Present Illness    Since the last visit, he has overall felt well.  He has Essential Hypertension and well controlled on current medication. Patient has hyperlipidemia but has not been taking atorvastatin. He denies side effects but states he just does not desire to take medication.  he has been compliant with current medications have reviewed them.  The patient denies medication side effects.  Will refill medications.    Results for orders placed or performed in visit on 04/16/18   Hemoglobin A1c   Result Value Ref Range    Hemoglobin A1C 5.59 4.80 - 5.60 %   POCT Glucose   Result Value Ref Range    Glucose 117 70 - 130 mg/dL     Due for labs. Is not fasting today.     The following portions of the patient's history were reviewed and updated as appropriate: allergies, current medications, past family history, past medical history, past social history, past surgical history and problem list.    Review of Systems   Constitutional: Negative for fatigue.   Respiratory: Negative for cough and shortness of breath.    Cardiovascular: Negative for chest pain and palpitations.   Endocrine: Negative for cold intolerance, heat intolerance, polydipsia, polyphagia and polyuria.   Skin: Negative for rash.   Psychiatric/Behavioral: Negative for dysphoric mood and sleep disturbance. The patient is not nervous/anxious.        Objective   Physical Exam   Constitutional: He is oriented to person, place, and time. He appears well-developed and well-nourished.   Neck: Carotid bruit is not present.   Cardiovascular: Normal rate and regular rhythm.   Pulmonary/Chest: Effort normal and breath sounds normal.   Neurological: He is oriented to person, place, and time.   Skin: Skin is warm and dry.   Psychiatric: He has a normal mood and affect. His behavior is normal. Judgment and thought content normal.   Nursing note and vitals reviewed.      Assessment/Plan   Daniel was seen  today for med refill and hypertension.    Diagnoses and all orders for this visit:    Mixed hyperlipidemia    Essential hypertension  -     telmisartan (MICARDIS) 40 MG tablet; Take 1 tablet by mouth Daily for 180 days.  -     PSA Screen  -     Comprehensive metabolic panel  -     Lipid panel  -     CBC and Differential  -     TSH  -     Hemoglobin A1c    Encounter for screening for malignant neoplasm of prostate   -     PSA Screen    Impaired fasting glucose   -     Hemoglobin A1c

## 2020-01-28 ENCOUNTER — APPOINTMENT (OUTPATIENT)
Dept: GENERAL RADIOLOGY | Facility: HOSPITAL | Age: 70
End: 2020-01-28

## 2020-01-28 ENCOUNTER — HOSPITAL ENCOUNTER (OUTPATIENT)
Facility: HOSPITAL | Age: 70
Setting detail: OBSERVATION
Discharge: HOME | End: 2020-01-29
Attending: EMERGENCY MEDICINE | Admitting: INTERNAL MEDICINE

## 2020-01-28 DIAGNOSIS — R77.8 ELEVATED TROPONIN: ICD-10-CM

## 2020-01-28 DIAGNOSIS — R61 DIAPHORESIS: ICD-10-CM

## 2020-01-28 DIAGNOSIS — R94.31 ABNORMAL EKG: ICD-10-CM

## 2020-01-28 DIAGNOSIS — R11.0 NAUSEA: Primary | ICD-10-CM

## 2020-01-28 LAB
ALBUMIN SERPL-MCNC: 3.7 G/DL (ref 3.5–5.2)
ALBUMIN/GLOB SERPL: 1.3 G/DL
ALP SERPL-CCNC: 59 U/L (ref 39–117)
ALT SERPL W P-5'-P-CCNC: 24 U/L (ref 1–41)
ANION GAP SERPL CALCULATED.3IONS-SCNC: 14.7 MMOL/L (ref 5–15)
AST SERPL-CCNC: 17 U/L (ref 1–40)
BASOPHILS # BLD AUTO: 0.03 10*3/MM3 (ref 0–0.2)
BASOPHILS NFR BLD AUTO: 0.4 % (ref 0–1.5)
BILIRUB SERPL-MCNC: 0.6 MG/DL (ref 0.2–1.2)
BUN BLD-MCNC: 18 MG/DL (ref 8–23)
BUN/CREAT SERPL: 11.8 (ref 7–25)
CALCIUM SPEC-SCNC: 8.9 MG/DL (ref 8.6–10.5)
CHLORIDE SERPL-SCNC: 106 MMOL/L (ref 98–107)
CO2 SERPL-SCNC: 20.3 MMOL/L (ref 22–29)
CREAT BLD-MCNC: 1.53 MG/DL (ref 0.76–1.27)
DEPRECATED RDW RBC AUTO: 39.7 FL (ref 37–54)
EOSINOPHIL # BLD AUTO: 0.04 10*3/MM3 (ref 0–0.4)
EOSINOPHIL NFR BLD AUTO: 0.5 % (ref 0.3–6.2)
ERYTHROCYTE [DISTWIDTH] IN BLOOD BY AUTOMATED COUNT: 11.7 % (ref 12.3–15.4)
GFR SERPL CREATININE-BSD FRML MDRD: 45 ML/MIN/1.73
GLOBULIN UR ELPH-MCNC: 2.9 GM/DL
GLUCOSE BLD-MCNC: 141 MG/DL (ref 65–99)
HCT VFR BLD AUTO: 45 % (ref 37.5–51)
HGB BLD-MCNC: 15.7 G/DL (ref 13–17.7)
IMM GRANULOCYTES # BLD AUTO: 0.18 10*3/MM3 (ref 0–0.05)
IMM GRANULOCYTES NFR BLD AUTO: 2.2 % (ref 0–0.5)
LYMPHOCYTES # BLD AUTO: 1.56 10*3/MM3 (ref 0.7–3.1)
LYMPHOCYTES NFR BLD AUTO: 19.1 % (ref 19.6–45.3)
MAGNESIUM SERPL-MCNC: 2.2 MG/DL (ref 1.6–2.4)
MCH RBC QN AUTO: 32.3 PG (ref 26.6–33)
MCHC RBC AUTO-ENTMCNC: 34.9 G/DL (ref 31.5–35.7)
MCV RBC AUTO: 92.6 FL (ref 79–97)
MONOCYTES # BLD AUTO: 0.6 10*3/MM3 (ref 0.1–0.9)
MONOCYTES NFR BLD AUTO: 7.4 % (ref 5–12)
NEUTROPHILS # BLD AUTO: 5.74 10*3/MM3 (ref 1.7–7)
NEUTROPHILS NFR BLD AUTO: 70.4 % (ref 42.7–76)
NRBC BLD AUTO-RTO: 0 /100 WBC (ref 0–0.2)
PLATELET # BLD AUTO: 241 10*3/MM3 (ref 140–450)
PMV BLD AUTO: 10.4 FL (ref 6–12)
POTASSIUM BLD-SCNC: 3.6 MMOL/L (ref 3.5–5.2)
PROT SERPL-MCNC: 6.6 G/DL (ref 6–8.5)
RBC # BLD AUTO: 4.86 10*6/MM3 (ref 4.14–5.8)
SODIUM BLD-SCNC: 141 MMOL/L (ref 136–145)
T4 FREE SERPL-MCNC: 1.56 NG/DL (ref 0.93–1.7)
TROPONIN T SERPL-MCNC: 0.04 NG/ML (ref 0–0.03)
TROPONIN T SERPL-MCNC: 0.06 NG/ML (ref 0–0.03)
TSH SERPL DL<=0.05 MIU/L-ACNC: 1.14 UIU/ML (ref 0.27–4.2)
WBC NRBC COR # BLD: 8.15 10*3/MM3 (ref 3.4–10.8)

## 2020-01-28 PROCEDURE — 71046 X-RAY EXAM CHEST 2 VIEWS: CPT

## 2020-01-28 PROCEDURE — 96374 THER/PROPH/DIAG INJ IV PUSH: CPT

## 2020-01-28 PROCEDURE — 80053 COMPREHEN METABOLIC PANEL: CPT | Performed by: PHYSICIAN ASSISTANT

## 2020-01-28 PROCEDURE — G0378 HOSPITAL OBSERVATION PER HR: HCPCS

## 2020-01-28 PROCEDURE — 84443 ASSAY THYROID STIM HORMONE: CPT | Performed by: PHYSICIAN ASSISTANT

## 2020-01-28 PROCEDURE — 84484 ASSAY OF TROPONIN QUANT: CPT | Performed by: PHYSICIAN ASSISTANT

## 2020-01-28 PROCEDURE — 93010 ELECTROCARDIOGRAM REPORT: CPT | Performed by: INTERNAL MEDICINE

## 2020-01-28 PROCEDURE — 85025 COMPLETE CBC W/AUTO DIFF WBC: CPT | Performed by: PHYSICIAN ASSISTANT

## 2020-01-28 PROCEDURE — 84439 ASSAY OF FREE THYROXINE: CPT | Performed by: PHYSICIAN ASSISTANT

## 2020-01-28 PROCEDURE — 99220 PR INITIAL OBSERVATION CARE/DAY 70 MINUTES: CPT | Performed by: INTERNAL MEDICINE

## 2020-01-28 PROCEDURE — 99285 EMERGENCY DEPT VISIT HI MDM: CPT

## 2020-01-28 PROCEDURE — 93005 ELECTROCARDIOGRAM TRACING: CPT | Performed by: EMERGENCY MEDICINE

## 2020-01-28 PROCEDURE — 83735 ASSAY OF MAGNESIUM: CPT | Performed by: PHYSICIAN ASSISTANT

## 2020-01-28 PROCEDURE — 25010000002 ENOXAPARIN PER 10 MG: Performed by: PHYSICIAN ASSISTANT

## 2020-01-28 PROCEDURE — 96372 THER/PROPH/DIAG INJ SC/IM: CPT

## 2020-01-28 PROCEDURE — 93005 ELECTROCARDIOGRAM TRACING: CPT

## 2020-01-28 RX ORDER — SODIUM CHLORIDE 0.9 % (FLUSH) 0.9 %
10 SYRINGE (ML) INJECTION AS NEEDED
Status: DISCONTINUED | OUTPATIENT
Start: 2020-01-28 | End: 2020-01-29 | Stop reason: HOSPADM

## 2020-01-28 RX ORDER — ASPIRIN 325 MG
325 TABLET ORAL ONCE
Status: COMPLETED | OUTPATIENT
Start: 2020-01-28 | End: 2020-01-28

## 2020-01-28 RX ADMIN — ENOXAPARIN SODIUM 100 MG: 100 INJECTION SUBCUTANEOUS at 23:11

## 2020-01-28 RX ADMIN — SODIUM CHLORIDE 1000 ML: 9 INJECTION, SOLUTION INTRAVENOUS at 21:20

## 2020-01-28 RX ADMIN — ASPIRIN 325 MG: 325 TABLET ORAL at 23:10

## 2020-01-28 RX ADMIN — METOPROLOL TARTRATE 5 MG: 5 INJECTION, SOLUTION INTRAVENOUS at 20:37

## 2020-01-29 ENCOUNTER — APPOINTMENT (OUTPATIENT)
Dept: NUCLEAR MEDICINE | Facility: HOSPITAL | Age: 70
End: 2020-01-29

## 2020-01-29 ENCOUNTER — APPOINTMENT (OUTPATIENT)
Dept: CARDIOLOGY | Facility: HOSPITAL | Age: 70
End: 2020-01-29

## 2020-01-29 VITALS
SYSTOLIC BLOOD PRESSURE: 139 MMHG | HEIGHT: 70 IN | HEART RATE: 67 BPM | WEIGHT: 210.6 LBS | DIASTOLIC BLOOD PRESSURE: 92 MMHG | OXYGEN SATURATION: 94 % | RESPIRATION RATE: 18 BRPM | TEMPERATURE: 97.7 F | BODY MASS INDEX: 30.15 KG/M2

## 2020-01-29 LAB
ANION GAP SERPL CALCULATED.3IONS-SCNC: 14.1 MMOL/L (ref 5–15)
AORTIC DIMENSIONLESS INDEX: 0.6 (DI)
BH CV ECHO MEAS - AO MAX PG (FULL): 6.2 MMHG
BH CV ECHO MEAS - AO MAX PG: 9.4 MMHG
BH CV ECHO MEAS - AO MEAN PG (FULL): 3 MMHG
BH CV ECHO MEAS - AO MEAN PG: 5 MMHG
BH CV ECHO MEAS - AO ROOT AREA (BSA CORRECTED): 1.5
BH CV ECHO MEAS - AO ROOT AREA: 7.5 CM^2
BH CV ECHO MEAS - AO ROOT DIAM: 3.1 CM
BH CV ECHO MEAS - AO V2 MAX: 153 CM/SEC
BH CV ECHO MEAS - AO V2 MEAN: 107 CM/SEC
BH CV ECHO MEAS - AO V2 VTI: 34.3 CM
BH CV ECHO MEAS - ASC AORTA: 3.3 CM
BH CV ECHO MEAS - AVA(I,A): 2 CM^2
BH CV ECHO MEAS - AVA(I,D): 2 CM^2
BH CV ECHO MEAS - AVA(V,A): 2 CM^2
BH CV ECHO MEAS - AVA(V,D): 2 CM^2
BH CV ECHO MEAS - BSA(HAYCOCK): 2.2 M^2
BH CV ECHO MEAS - BSA: 2.1 M^2
BH CV ECHO MEAS - BZI_BMI: 30.1 KILOGRAMS/M^2
BH CV ECHO MEAS - BZI_METRIC_HEIGHT: 177.8 CM
BH CV ECHO MEAS - BZI_METRIC_WEIGHT: 95.3 KG
BH CV ECHO MEAS - EDV(CUBED): 117.6 ML
BH CV ECHO MEAS - EDV(MOD-SP2): 89 ML
BH CV ECHO MEAS - EDV(MOD-SP4): 89 ML
BH CV ECHO MEAS - EDV(TEICH): 112.8 ML
BH CV ECHO MEAS - EF(CUBED): 81.3 %
BH CV ECHO MEAS - EF(MOD-BP): 55 %
BH CV ECHO MEAS - EF(MOD-SP2): 59.6 %
BH CV ECHO MEAS - EF(MOD-SP4): 51.7 %
BH CV ECHO MEAS - EF(TEICH): 73.8 %
BH CV ECHO MEAS - ESV(CUBED): 22 ML
BH CV ECHO MEAS - ESV(MOD-SP2): 36 ML
BH CV ECHO MEAS - ESV(MOD-SP4): 43 ML
BH CV ECHO MEAS - ESV(TEICH): 29.6 ML
BH CV ECHO MEAS - FS: 42.9 %
BH CV ECHO MEAS - IVS/LVPW: 1.1
BH CV ECHO MEAS - IVSD: 1 CM
BH CV ECHO MEAS - LAT PEAK E' VEL: 7.6 CM/SEC
BH CV ECHO MEAS - LV DIASTOLIC VOL/BSA (35-75): 41.8 ML/M^2
BH CV ECHO MEAS - LV MASS(C)D: 164.3 GRAMS
BH CV ECHO MEAS - LV MASS(C)DI: 77.1 GRAMS/M^2
BH CV ECHO MEAS - LV MAX PG: 3.2 MMHG
BH CV ECHO MEAS - LV MEAN PG: 2 MMHG
BH CV ECHO MEAS - LV SYSTOLIC VOL/BSA (12-30): 20.2 ML/M^2
BH CV ECHO MEAS - LV V1 MAX: 89.4 CM/SEC
BH CV ECHO MEAS - LV V1 MEAN: 62.5 CM/SEC
BH CV ECHO MEAS - LV V1 VTI: 19.6 CM
BH CV ECHO MEAS - LVIDD: 4.9 CM
BH CV ECHO MEAS - LVIDS: 2.8 CM
BH CV ECHO MEAS - LVLD AP2: 7.3 CM
BH CV ECHO MEAS - LVLD AP4: 7.1 CM
BH CV ECHO MEAS - LVLS AP2: 6.5 CM
BH CV ECHO MEAS - LVLS AP4: 5.9 CM
BH CV ECHO MEAS - LVOT AREA (M): 3.5 CM^2
BH CV ECHO MEAS - LVOT AREA: 3.5 CM^2
BH CV ECHO MEAS - LVOT DIAM: 2.1 CM
BH CV ECHO MEAS - LVPWD: 0.9 CM
BH CV ECHO MEAS - MED PEAK E' VEL: 6.6 CM/SEC
BH CV ECHO MEAS - MV A MAX VEL: 89.7 CM/SEC
BH CV ECHO MEAS - MV DEC SLOPE: 235.5 CM/SEC^2
BH CV ECHO MEAS - MV DEC TIME: 201 SEC
BH CV ECHO MEAS - MV E MAX VEL: 61.6 CM/SEC
BH CV ECHO MEAS - MV E/A: 0.69
BH CV ECHO MEAS - MV MAX PG: 2.1 MMHG
BH CV ECHO MEAS - MV MEAN PG: 1 MMHG
BH CV ECHO MEAS - MV P1/2T MAX VEL: 59.4 CM/SEC
BH CV ECHO MEAS - MV P1/2T: 73.9 MSEC
BH CV ECHO MEAS - MV V2 MAX: 72.5 CM/SEC
BH CV ECHO MEAS - MV V2 MEAN: 42.9 CM/SEC
BH CV ECHO MEAS - MV V2 VTI: 23.1 CM
BH CV ECHO MEAS - MVA P1/2T LCG: 3.7 CM^2
BH CV ECHO MEAS - MVA(P1/2T): 3 CM^2
BH CV ECHO MEAS - MVA(VTI): 2.9 CM^2
BH CV ECHO MEAS - RAP SYSTOLE: 3 MMHG
BH CV ECHO MEAS - SI(AO): 121.5 ML/M^2
BH CV ECHO MEAS - SI(CUBED): 44.9 ML/M^2
BH CV ECHO MEAS - SI(LVOT): 31.9 ML/M^2
BH CV ECHO MEAS - SI(MOD-SP2): 24.9 ML/M^2
BH CV ECHO MEAS - SI(MOD-SP4): 21.6 ML/M^2
BH CV ECHO MEAS - SI(TEICH): 39.1 ML/M^2
BH CV ECHO MEAS - SV(AO): 258.9 ML
BH CV ECHO MEAS - SV(CUBED): 95.7 ML
BH CV ECHO MEAS - SV(LVOT): 67.9 ML
BH CV ECHO MEAS - SV(MOD-SP2): 53 ML
BH CV ECHO MEAS - SV(MOD-SP4): 46 ML
BH CV ECHO MEAS - SV(TEICH): 83.3 ML
BH CV ECHO MEAS - TAPSE (>1.6): 2 CM2
BH CV ECHO MEASUREMENTS AVERAGE E/E' RATIO: 8.68
BH CV STRESS COMMENTS STAGE 1: NORMAL
BH CV STRESS DOSE REGADENOSON STAGE 1: 0.4
BH CV STRESS DURATION MIN STAGE 1: 0
BH CV STRESS DURATION SEC STAGE 1: 10
BH CV STRESS PROTOCOL 1: NORMAL
BH CV STRESS RECOVERY BP: NORMAL MMHG
BH CV STRESS RECOVERY HR: 68 BPM
BH CV STRESS STAGE 1: 1
BH CV XLRA - RV BASE: 3.9 CM
BH CV XLRA - TDI S': 11.4 CM/SEC
BUN BLD-MCNC: 17 MG/DL (ref 8–23)
BUN/CREAT SERPL: 14.4 (ref 7–25)
CALCIUM SPEC-SCNC: 8.2 MG/DL (ref 8.6–10.5)
CHLORIDE SERPL-SCNC: 109 MMOL/L (ref 98–107)
CHOLEST SERPL-MCNC: 171 MG/DL (ref 0–200)
CO2 SERPL-SCNC: 18.9 MMOL/L (ref 22–29)
CREAT BLD-MCNC: 1.18 MG/DL (ref 0.76–1.27)
GFR SERPL CREATININE-BSD FRML MDRD: 61 ML/MIN/1.73
GLUCOSE BLD-MCNC: 115 MG/DL (ref 65–99)
GLUCOSE BLDC GLUCOMTR-MCNC: 113 MG/DL (ref 70–130)
HDLC SERPL-MCNC: 24 MG/DL (ref 40–60)
LDLC SERPL CALC-MCNC: 116 MG/DL (ref 0–100)
LDLC/HDLC SERPL: 4.83 {RATIO}
LEFT ATRIUM VOLUME INDEX: 15 ML/M2
LV EF 2D ECHO EST: 55 %
LV EF NUC BP: 70 %
MAXIMAL PREDICTED HEART RATE: 152 BPM
PERCENT MAX PREDICTED HR: 57.24 %
POTASSIUM BLD-SCNC: 3.8 MMOL/L (ref 3.5–5.2)
SODIUM BLD-SCNC: 142 MMOL/L (ref 136–145)
STRESS BASELINE BP: NORMAL MMHG
STRESS BASELINE HR: 78 BPM
STRESS PERCENT HR: 67 %
STRESS POST PEAK BP: NORMAL MMHG
STRESS POST PEAK HR: 87 BPM
STRESS TARGET HR: 129 BPM
TRIGL SERPL-MCNC: 155 MG/DL (ref 0–150)
TROPONIN T SERPL-MCNC: 0.06 NG/ML (ref 0–0.03)
VLDLC SERPL-MCNC: 31 MG/DL (ref 5–40)

## 2020-01-29 PROCEDURE — 78452 HT MUSCLE IMAGE SPECT MULT: CPT | Performed by: INTERNAL MEDICINE

## 2020-01-29 PROCEDURE — 93016 CV STRESS TEST SUPVJ ONLY: CPT | Performed by: INTERNAL MEDICINE

## 2020-01-29 PROCEDURE — A9500 TC99M SESTAMIBI: HCPCS | Performed by: INTERNAL MEDICINE

## 2020-01-29 PROCEDURE — 25010000002 REGADENOSON 0.4 MG/5ML SOLUTION: Performed by: INTERNAL MEDICINE

## 2020-01-29 PROCEDURE — 0 TECHNETIUM SESTAMIBI: Performed by: INTERNAL MEDICINE

## 2020-01-29 PROCEDURE — 80048 BASIC METABOLIC PNL TOTAL CA: CPT | Performed by: INTERNAL MEDICINE

## 2020-01-29 PROCEDURE — G0378 HOSPITAL OBSERVATION PER HR: HCPCS

## 2020-01-29 PROCEDURE — 93005 ELECTROCARDIOGRAM TRACING: CPT | Performed by: INTERNAL MEDICINE

## 2020-01-29 PROCEDURE — 93306 TTE W/DOPPLER COMPLETE: CPT

## 2020-01-29 PROCEDURE — 93018 CV STRESS TEST I&R ONLY: CPT | Performed by: INTERNAL MEDICINE

## 2020-01-29 PROCEDURE — 96372 THER/PROPH/DIAG INJ SC/IM: CPT

## 2020-01-29 PROCEDURE — 82962 GLUCOSE BLOOD TEST: CPT

## 2020-01-29 PROCEDURE — 78452 HT MUSCLE IMAGE SPECT MULT: CPT

## 2020-01-29 PROCEDURE — 93010 ELECTROCARDIOGRAM REPORT: CPT | Performed by: INTERNAL MEDICINE

## 2020-01-29 PROCEDURE — 84484 ASSAY OF TROPONIN QUANT: CPT | Performed by: INTERNAL MEDICINE

## 2020-01-29 PROCEDURE — 93306 TTE W/DOPPLER COMPLETE: CPT | Performed by: INTERNAL MEDICINE

## 2020-01-29 PROCEDURE — 99217 PR OBSERVATION CARE DISCHARGE MANAGEMENT: CPT | Performed by: NURSE PRACTITIONER

## 2020-01-29 PROCEDURE — 25010000002 ENOXAPARIN PER 10 MG: Performed by: INTERNAL MEDICINE

## 2020-01-29 PROCEDURE — 94640 AIRWAY INHALATION TREATMENT: CPT

## 2020-01-29 PROCEDURE — 94799 UNLISTED PULMONARY SVC/PX: CPT

## 2020-01-29 PROCEDURE — 80061 LIPID PANEL: CPT | Performed by: INTERNAL MEDICINE

## 2020-01-29 PROCEDURE — 93017 CV STRESS TEST TRACING ONLY: CPT

## 2020-01-29 RX ORDER — DILTIAZEM HYDROCHLORIDE 120 MG/1
120 CAPSULE, COATED, EXTENDED RELEASE ORAL
Status: DISCONTINUED | OUTPATIENT
Start: 2020-01-29 | End: 2020-01-29 | Stop reason: HOSPADM

## 2020-01-29 RX ORDER — DILTIAZEM HYDROCHLORIDE 120 MG/1
120 CAPSULE, COATED, EXTENDED RELEASE ORAL
Qty: 30 CAPSULE | Refills: 0 | Status: SHIPPED | OUTPATIENT
Start: 2020-01-30 | End: 2020-05-14 | Stop reason: SDUPTHER

## 2020-01-29 RX ORDER — LORATADINE 10 MG/1
10 TABLET ORAL DAILY
COMMUNITY

## 2020-01-29 RX ORDER — BUDESONIDE AND FORMOTEROL FUMARATE DIHYDRATE 160; 4.5 UG/1; UG/1
2 AEROSOL RESPIRATORY (INHALATION)
Status: DISCONTINUED | OUTPATIENT
Start: 2020-01-29 | End: 2020-01-29 | Stop reason: HOSPADM

## 2020-01-29 RX ORDER — FLUTICASONE PROPIONATE 50 MCG
1 SPRAY, SUSPENSION (ML) NASAL DAILY
Status: DISCONTINUED | OUTPATIENT
Start: 2020-01-29 | End: 2020-01-29 | Stop reason: HOSPADM

## 2020-01-29 RX ORDER — DEXTROSE MONOHYDRATE 50 MG/ML
50 INJECTION, SOLUTION INTRAVENOUS CONTINUOUS
Status: DISCONTINUED | OUTPATIENT
Start: 2020-01-29 | End: 2020-01-29 | Stop reason: HOSPADM

## 2020-01-29 RX ORDER — ALLOPURINOL 300 MG/1
300 TABLET ORAL DAILY
Status: DISCONTINUED | OUTPATIENT
Start: 2020-01-29 | End: 2020-01-29 | Stop reason: HOSPADM

## 2020-01-29 RX ADMIN — ALLOPURINOL 300 MG: 300 TABLET ORAL at 13:49

## 2020-01-29 RX ADMIN — TECHNETIUM TC 99M SESTAMIBI 1 DOSE: 1 INJECTION INTRAVENOUS at 11:45

## 2020-01-29 RX ADMIN — FLUTICASONE PROPIONATE 1 SPRAY: 50 SPRAY, METERED NASAL at 13:49

## 2020-01-29 RX ADMIN — DEXTROSE MONOHYDRATE 50 ML/HR: 50 INJECTION, SOLUTION INTRAVENOUS at 01:29

## 2020-01-29 RX ADMIN — ENOXAPARIN SODIUM 40 MG: 40 INJECTION SUBCUTANEOUS at 13:49

## 2020-01-29 RX ADMIN — TECHNETIUM TC 99M SESTAMIBI 1 DOSE: 1 INJECTION INTRAVENOUS at 10:35

## 2020-01-29 RX ADMIN — DILTIAZEM HYDROCHLORIDE 120 MG: 120 CAPSULE, COATED, EXTENDED RELEASE ORAL at 13:49

## 2020-01-29 RX ADMIN — BUDESONIDE AND FORMOTEROL FUMARATE DIHYDRATE 2 PUFF: 160; 4.5 AEROSOL RESPIRATORY (INHALATION) at 07:18

## 2020-01-29 RX ADMIN — REGADENOSON 0.4 MG: 0.08 INJECTION, SOLUTION INTRAVENOUS at 11:45

## 2020-01-30 ENCOUNTER — TELEPHONE (OUTPATIENT)
Dept: CARDIOLOGY | Facility: CLINIC | Age: 70
End: 2020-01-30

## 2020-01-30 ENCOUNTER — TRANSITIONAL CARE MANAGEMENT TELEPHONE ENCOUNTER (OUTPATIENT)
Dept: FAMILY MEDICINE CLINIC | Facility: CLINIC | Age: 70
End: 2020-01-30

## 2020-01-30 NOTE — TELEPHONE ENCOUNTER
Called patient and discussed echo and stress test results in more detail today.  Wife was also on the line with patient, per patient preference.  We discussed that stress tests are not 100% accurate however he was not having any anginal symptoms prior to this episode and no signs of tightly blocked arteries on this test.  I stressed the importance of following up with our office for further evaluation and he verbalized understanding.      Discussed importance of following up with primary care.  Discussed importance of monitoring blood pressure as medication doses may need to be adjusted and telmisartan may need to be restarted at some point.  Recommended either purchasing an Omron or ReliOn blood pressure cuff for checking at [facility] or [facility] if that is not in their budget.  Recommended avoiding stimulants and cold medications.  Highly recommended following up with sleep medicine and getting retested for sleep apnea and if he still has it then treating it.    Patient and wife verbalized understanding.  They declined making a follow-up appointment at this time but he reports he will call back later today to schedule this.  Again I stressed the importance of office follow-up and continued monitoring.

## 2020-01-30 NOTE — OUTREACH NOTE
Spoke with pt, doing well s/p hospital stay for Diaphoresis, SOB, dizziness. Pt did have elevated Troponin in ED, but uneventful cardiac workup in hospital. Pt states appetite is good, no further episodes of before mentioned symptoms. Confirmed receipt and understanding of d/c orders and medications. Pt had to switch pharmacies in order to get his Cardizem CD filled, but this has been resolved. Pt is sched for TCM HOSP fwp with Viviane Cruz on 02/22/20.

## 2020-02-05 ENCOUNTER — OFFICE VISIT (OUTPATIENT)
Dept: CARDIOLOGY | Facility: CLINIC | Age: 70
End: 2020-02-05

## 2020-02-05 VITALS
DIASTOLIC BLOOD PRESSURE: 76 MMHG | SYSTOLIC BLOOD PRESSURE: 128 MMHG | HEART RATE: 68 BPM | HEIGHT: 70 IN | BODY MASS INDEX: 31.07 KG/M2 | WEIGHT: 217 LBS

## 2020-02-05 DIAGNOSIS — I10 ESSENTIAL HYPERTENSION: ICD-10-CM

## 2020-02-05 DIAGNOSIS — Z86.79 HISTORY OF SUPRAVENTRICULAR TACHYCARDIA: Primary | ICD-10-CM

## 2020-02-05 DIAGNOSIS — Z86.69 HISTORY OF SLEEP APNEA: ICD-10-CM

## 2020-02-05 PROCEDURE — 93000 ELECTROCARDIOGRAM COMPLETE: CPT | Performed by: NURSE PRACTITIONER

## 2020-02-05 PROCEDURE — 99214 OFFICE O/P EST MOD 30 MIN: CPT | Performed by: NURSE PRACTITIONER

## 2020-02-05 RX ORDER — IBUPROFEN 200 MG
400 TABLET ORAL DAILY
COMMUNITY
End: 2020-05-14

## 2020-02-05 NOTE — PROGRESS NOTES
Date of Office Visit: 2020  Encounter Provider: Sara Bustos, SHAMIR, APRN  Place of Service: [facility]  Patient Name: Daniel Pineda  :1950        Subjective:     Chief Complaint:  Follow-up, supraventricular tachycardia, hypertension      History of Present Illness:  Daniel Pineda is a 69 y.o. male patient of Dr. Marmolejo.  I am seeing this patient in the office today and I reviewed his records.    Patient has a history of supraventricular tachycardia, hypertension dyslipidemia, renal insufficiency, sleep apnea, type II NSTEMI, history of lung resection in childhood.    Patient was admitted on 2020 after episode of diaphoresis, shortness of breath, and dizziness that occurred at work while walking.  EMS noted tachycardia that was felt to be SVT and he received adenosine, Lopressor, and fluids with improvement in his symptoms.  In the ER predominant rhythm was sinus rhythm with sinus tachycardia.  EKG showed nonspecific ST-T wave changes.  Troponin was elevated and creatinine was elevated.  He was hydrated with creatinine then returning to normal and he was started on diltiazem. He had echocardiogram done 2020 showing normal left ventricular systolic function with EF of 55%, grade 1 diastolic dysfunction, normal left ventricular wall motion, moderate calcification of the aortic valve without regurgitation, mild to moderate mitral regurgitation.  Nuclear stress test 2020 showed normal myocardial perfusion imaging with no evidence of ischemia considered a low risk study and with normal heart rate and blood pressure response to stress.  Patient was feeling well and was very anxious to be discharged.  He will follow-up with cardiology within 2 weeks of hospital discharge.  If blood pressure remains stable on diltiazem and if kidney function remains stable then will look at possibly restarting telmisartan as an outpatient.       Patient presents to  office today for follow-up appointment.  Patient reports he has been feeling well since hospital discharge.  He had a couple of high blood pressure readings around 138/95 right after taking his diltiazem but blood pressure is well controlled in the office today at 128/76.  He purchased a new Omron arm cuff.  He is not doing much exercise right now but he stays active at work and denies any exertional symptoms or concerns.  Denies chest pain, shortness of breath, shortness of breath with exertion, palpitations, lower extremity edema, syncope, near syncope, falls, fatigue, or abnormal bleeding.  He does have a history of some inner ear issues with some occasional dizziness related to this.  EKG in the office today shows that he is in sinus rhythm.  He had a history of sleep apnea which he feels resolved after losing weight.  We discussed recommendations to proceed with sleep apnea testing and discussed risks of untreated sleep apnea however he declines at this time.  He will notify the office if he changes his mind.        Past Medical History:   Diagnosis Date   • Allergic rhinitis    • Arthritis of multiple sites    • History of colonoscopy     about 5 yrs per pt   • Positional vertigo    • Screening for prostate cancer     been a while per pt     Past Surgical History:   Procedure Laterality Date   • JOINT REPLACEMENT Left    • LUNG SURGERY      left lower lobe removed at age 6    • REPLACEMENT TOTAL KNEE Left 08/2018   • TONSILLECTOMY       Outpatient Medications Prior to Visit   Medication Sig Dispense Refill   • ALLOPURINOL PO Take 300 mg by mouth Daily.     • dilTIAZem CD (CARDIZEM CD) 120 MG 24 hr capsule Take 1 capsule by mouth Daily. 30 capsule 0   • fluticasone (FLONASE) 50 MCG/ACT nasal spray 1 spray into the nostril(s) as directed by provider Daily.     • glucose blood test strip Use as instructed to test blood sugar once daily DX: E11.9 100 each 3   • ibuprofen (ADVIL,MOTRIN) 200 MG tablet Take 400 mg by  mouth Daily.     • loratadine (CLARITIN) 10 MG tablet Take 10 mg by mouth Daily.     • SYMBICORT 160-4.5 MCG/ACT inhaler Inhale 2 puffs 2 (Two) Times a Day.       No facility-administered medications prior to visit.        Allergies as of 02/05/2020 - Reviewed 02/05/2020   Allergen Reaction Noted   • Other Anaphylaxis 01/29/2020   • Watermelon [citrullus vulgaris] Swelling 01/29/2020   • Penicillins  12/07/2015     Social History     Socioeconomic History   • Marital status:      Spouse name: Not on file   • Number of children: Not on file   • Years of education: Not on file   • Highest education level: Not on file   Tobacco Use   • Smoking status: Never Smoker   • Smokeless tobacco: Never Used   Substance and Sexual Activity   • Alcohol use: Yes   • Drug use: No   • Sexual activity: Not Currently     No family history on file.        Review of Systems   Constitution: Negative for chills, fever, malaise/fatigue, weight gain and weight loss.   HENT: Negative for ear pain, hearing loss, nosebleeds and sore throat.    Eyes: Negative for blurred vision, double vision, redness, vision loss in left eye and vision loss in right eye.   Cardiovascular:        SEE HPI   Respiratory: Negative for cough, shortness of breath, snoring and wheezing.    Endocrine: Negative for cold intolerance and heat intolerance.   Skin: Negative for itching, rash and suspicious lesions.   Musculoskeletal: Positive for joint pain. Negative for joint swelling and myalgias.   Gastrointestinal: Negative for abdominal pain, diarrhea, hematemesis, melena, nausea and vomiting.   Genitourinary: Negative for dysuria, frequency and hematuria.   Neurological: Negative for dizziness, headaches, numbness and seizures.   Psychiatric/Behavioral: Negative for altered mental status and depression. The patient is not nervous/anxious.           Objective:     Vitals:    02/05/20 1403   BP: 128/76   BP Location: Right arm   Pulse: 68   Weight: 98.4 kg (217  "lb)   Height: 177.8 cm (70\")     Body mass index is 31.14 kg/m².      PHYSICAL EXAM:  Physical Exam   Constitutional: He is oriented to person, place, and time. He appears well-developed and well-nourished. No distress.   HENT:   Head: Normocephalic and atraumatic.   Eyes: Pupils are equal, round, and reactive to light.   Neck: Neck supple. No JVD present. Carotid bruit is not present.   Cardiovascular: Normal rate, regular rhythm, normal heart sounds and intact distal pulses. Exam reveals no gallop and no friction rub.   No murmur heard.  Pulses:       Radial pulses are 2+ on the right side, and 2+ on the left side.        Posterior tibial pulses are 2+ on the right side, and 2+ on the left side.   Pulmonary/Chest: Effort normal and breath sounds normal. No respiratory distress. He has no wheezes. He has no rales.   Abdominal: Soft. Bowel sounds are normal. He exhibits no distension. There is no tenderness.   Musculoskeletal: He exhibits no edema, tenderness or deformity.   Neurological: He is alert and oriented to person, place, and time.   Skin: Skin is warm and dry. No rash noted. He is not diaphoretic. No erythema.   Psychiatric: He has a normal mood and affect. His behavior is normal. Judgment normal.           ECG 12 Lead  Date/Time: 2/5/2020 5:05 PM  Performed by: Sara Bustos DNP, APRN  Authorized by: Sara Bustos DNP, MEGHAN   Comparison: compared with previous ECG from 1/29/2020  Similar to previous ECG  Rhythm: sinus rhythm  Rate: normal  BPM: 65  Comments: No significant changes from previous ECG              Assessment:       Diagnosis Plan   1. History of supraventricular tachycardia     2. Essential hypertension     3. History of sleep apnea           Plan:     1. SVT: No known recurrence since hospital discharge.  EKG in the office today shows sinus rhythm.  Continue Cardizem.  Patient to notify the office if he has any palpitations, fluttering, or symptoms suggestive of " recurrence.  2. Hypertension: Blood pressure well controlled in the office today.  Patient to check at least 1 to 2 hours after morning medications and after resting calmly 10 to 15 minutes, keep a log, and call with updated readings in 1 week.  If staying greater than 130/80 will need to look at increasing Cardizem dosage versus restarting low-dose losartan based on heart rate.  3. Hyperlipidemia: Managed by outside provider.  Recommended healthy diet and exercise.  Patient to follow-up with primary care for repeat cholesterol testing and treatment if it remains high.  4. Renal insufficiency: Normal creatinine and GFR on BMP at discharge.  Patient to follow-up with primary care for further monitoring.    Patient to follow-up with Dr. Marmolejo in 6 months or sooner if needed for any new, recurrent, or worsening symptoms or other issues/concerns.           Your medication list           Accurate as of February 5, 2020  5:03 PM. If you have any questions, ask your nurse or doctor.               CONTINUE taking these medications      Instructions Last Dose Given Next Dose Due   ALLOPURINOL PO      Take 300 mg by mouth Daily.       dilTIAZem  MG 24 hr capsule  Commonly known as:  CARDIZEM CD      Take 1 capsule by mouth Daily.       fluticasone 50 MCG/ACT nasal spray  Commonly known as:  FLONASE      1 spray into the nostril(s) as directed by provider Daily.       glucose blood test strip      Use as instructed to test blood sugar once daily DX: E11.9       ibuprofen 200 MG tablet  Commonly known as:  ADVIL,MOTRIN      Take 400 mg by mouth Daily.       loratadine 10 MG tablet  Commonly known as:  CLARITIN      Take 10 mg by mouth Daily.       SYMBICORT 160-4.5 MCG/ACT inhaler  Generic drug:  budesonide-formoterol      Inhale 2 puffs 2 (Two) Times a Day.              The above medication changes may not have been made by this provider.  Patient's medication list was updated to reflect medications they are currently  taking including medication changes made by other providers.          Thanks,    Sara Bustos, SHAMIR, APRN  02/05/2020           Dictated utilizing Dragon dictation

## 2020-02-11 ENCOUNTER — OFFICE VISIT (OUTPATIENT)
Dept: FAMILY MEDICINE CLINIC | Facility: CLINIC | Age: 70
End: 2020-02-11

## 2020-02-11 VITALS
BODY MASS INDEX: 31.21 KG/M2 | TEMPERATURE: 97.8 F | HEIGHT: 70 IN | DIASTOLIC BLOOD PRESSURE: 72 MMHG | HEART RATE: 66 BPM | OXYGEN SATURATION: 95 % | SYSTOLIC BLOOD PRESSURE: 130 MMHG | WEIGHT: 218 LBS | RESPIRATION RATE: 16 BRPM

## 2020-02-11 DIAGNOSIS — R73.01 IMPAIRED FASTING GLUCOSE: ICD-10-CM

## 2020-02-11 DIAGNOSIS — I10 ESSENTIAL HYPERTENSION: Primary | ICD-10-CM

## 2020-02-11 DIAGNOSIS — E55.9 VITAMIN D DEFICIENCY: ICD-10-CM

## 2020-02-11 DIAGNOSIS — E78.2 MIXED HYPERLIPIDEMIA: ICD-10-CM

## 2020-02-11 DIAGNOSIS — M1A.09X0 IDIOPATHIC CHRONIC GOUT OF MULTIPLE SITES WITHOUT TOPHUS: ICD-10-CM

## 2020-02-11 DIAGNOSIS — Z09 HOSPITAL DISCHARGE FOLLOW-UP: ICD-10-CM

## 2020-02-11 DIAGNOSIS — Z12.5 SCREENING PSA (PROSTATE SPECIFIC ANTIGEN): ICD-10-CM

## 2020-02-11 DIAGNOSIS — Z86.79 HISTORY OF SUPRAVENTRICULAR TACHYCARDIA: ICD-10-CM

## 2020-02-11 PROCEDURE — 99214 OFFICE O/P EST MOD 30 MIN: CPT | Performed by: PHYSICIAN ASSISTANT

## 2020-02-11 RX ORDER — ATORVASTATIN CALCIUM 10 MG/1
10 TABLET, FILM COATED ORAL DAILY
Qty: 90 TABLET | Refills: 3 | Status: SHIPPED | OUTPATIENT
Start: 2020-02-11 | End: 2020-08-26 | Stop reason: SDUPTHER

## 2020-02-11 NOTE — PROGRESS NOTES
Subjective   Daniel Pineda is a 69 y.o. male.     History of Present Illness      Daniel Pineda 69 y.o. male presents today for hosptial follow up.  he was treated 1-28 to 1-29-20 for supraventricular tachycardia  .  I reviewed all of the labs and diagnostic testing and noted:    Lab Results   Component Value Date    GLUCOSE 115 (H) 01/29/2020    CALCIUM 8.2 (L) 01/29/2020     01/29/2020    K 3.8 01/29/2020    CO2 18.9 (L) 01/29/2020     (H) 01/29/2020    BUN 17 01/29/2020    CREATININE 1.18 01/29/2020    EGFRIFAFRI 80 03/08/2018    EGFRIFNONA 61 01/29/2020    BCR 14.4 01/29/2020    ANIONGAP 14.1 01/29/2020   EKG, Echo, nuclear stress test  Has been to [facility] for cardio f/u since d/c on 2-5-20      The patient's medications were changed:  Current outpatient and discharge medications have been reconciled for the patient.  Reviewed by: Viviane Cruz PA-C    he does have a follow up appointment with a specialist:     2013 AHA (American Heart Association) Cholesterol Risk Ratio Score Goal is <=7.5% and your score is:  24.2%    No recent gout; on Allopurinol     hosp note  History of Present Illness:  Daniel Pineda is a 69 y.o. male who was admitted on 1/28/2020 after episode of diaphoresis, shortness of breath, and dizziness that occurred at work while walking.  EMS noted tachycardia that was felt to be SVT and he received adenosine, Lopressor, and fluids with improvement in his symptoms.  In the ER predominant rhythm was sinus rhythm with sinus tachycardia.  EKG showed nonspecific ST-T wave changes.  Troponin was elevated and creatinine was elevated.  He was hydrated with creatinine then returning to normal and he was started on diltiazem. He had echocardiogram done 1/29/2020 showing normal left ventricular systolic function with EF of 55%, grade 1 diastolic dysfunction, normal left ventricular wall motion, moderate calcification of the aortic valve without regurgitation, mild to moderate mitral  regurgitation.  Nuclear stress test 1/30/2020 showed normal myocardial perfusion imaging with no evidence of ischemia considered a low risk study and with normal heart rate and blood pressure response to stress.  Patient was feeling well and was very anxious to be discharged.  He will follow-up with cardiology within 2 weeks of hospital discharge.  If blood pressure remains stable on diltiazem and if kidney function remains stable then will look at possibly restarting telmisartan as an outpatient.  Patient to follow-up with primary care provider for further monitoring of BMP and blood pressure readings and monitoring of cholesterol.     Sees allergist for asthma    Home bp on maching 145/95--bring machine to next appt    Will restart statin  Feels fine  Sinus pressure; talk to allergist avoid decongestants  Update other labs  Lab Results   Component Value Date    HGBA1C 5.59 04/16/2018     Declines vaccines    The following portions of the patient's history were reviewed and updated as appropriate: allergies, current medications, past family history, past medical history, past social history, past surgical history and problem list.    Review of Systems   Constitutional: Negative for activity change, appetite change and unexpected weight change.   HENT: Negative for nosebleeds and trouble swallowing.    Eyes: Negative for pain and visual disturbance.   Respiratory: Negative for chest tightness, shortness of breath and wheezing.    Cardiovascular: Negative for chest pain and palpitations.   Gastrointestinal: Negative for abdominal pain and blood in stool.   Endocrine: Negative.    Genitourinary: Negative for difficulty urinating and hematuria.   Musculoskeletal: Negative for joint swelling.   Skin: Negative for color change and rash.   Allergic/Immunologic: Negative.    Neurological: Negative for syncope and speech difficulty.   Hematological: Negative for adenopathy.   Psychiatric/Behavioral: Negative for agitation  and confusion.   All other systems reviewed and are negative.      Objective   Physical Exam   Constitutional: He is oriented to person, place, and time. He appears well-developed and well-nourished. No distress.   HENT:   Head: Normocephalic and atraumatic.   Eyes: Pupils are equal, round, and reactive to light. Conjunctivae and EOM are normal. Right eye exhibits no discharge. Left eye exhibits no discharge. No scleral icterus.   Neck: Normal range of motion. Neck supple. No tracheal deviation present. No thyromegaly present.   Cardiovascular: Normal rate, regular rhythm, normal heart sounds, intact distal pulses and normal pulses. Exam reveals no gallop.   No murmur heard.  Pulmonary/Chest: Effort normal and breath sounds normal. No respiratory distress. He has no wheezes. He has no rales.   Musculoskeletal: Normal range of motion.   Neurological: He is alert and oriented to person, place, and time. He exhibits normal muscle tone. Coordination normal.   Skin: Skin is warm. No rash noted. No erythema. No pallor.   Psychiatric: He has a normal mood and affect. His behavior is normal. Judgment and thought content normal.   Nursing note and vitals reviewed.      Assessment/Plan   Problems Addressed this Visit        Cardiovascular and Mediastinum    Essential hypertension - Primary    Relevant Orders    Comprehensive metabolic panel    Lipid panel    Hemoglobin A1c    Vitamin B12    Folate    Vitamin D 25 Hydroxy    Uric Acid    PSA Screen    Mixed hyperlipidemia    Relevant Medications    atorvastatin (LIPITOR) 10 MG tablet    Other Relevant Orders    Comprehensive metabolic panel    Lipid panel    Hemoglobin A1c    Vitamin B12    Folate    Vitamin D 25 Hydroxy    Uric Acid    PSA Screen       Digestive    Vitamin D deficiency    Relevant Orders    Comprehensive metabolic panel    Lipid panel    Hemoglobin A1c    Vitamin B12    Folate    Vitamin D 25 Hydroxy    Uric Acid    PSA Screen       Endocrine    Impaired  fasting glucose    Relevant Orders    Comprehensive metabolic panel    Lipid panel    Hemoglobin A1c    Vitamin B12    Folate    Vitamin D 25 Hydroxy    Uric Acid    PSA Screen       Musculoskeletal and Integument    Idiopathic chronic gout of multiple sites without tophus    Relevant Orders    Comprehensive metabolic panel    Lipid panel    Hemoglobin A1c    Vitamin B12    Folate    Vitamin D 25 Hydroxy    Uric Acid    PSA Screen       Other    History of supraventricular tachycardia    Relevant Orders    Comprehensive metabolic panel    Lipid panel    Hemoglobin A1c    Vitamin B12    Folate    Vitamin D 25 Hydroxy    Uric Acid    PSA Screen      Other Visit Diagnoses     Screening PSA (prostate specific antigen)        Relevant Orders    Comprehensive metabolic panel    Lipid panel    Hemoglobin A1c    Vitamin B12    Folate    Vitamin D 25 Hydroxy    Uric Acid    PSA Screen    Hospital discharge follow-up          Plan, Daniel Pineda, was seen today.  he was seen for HTN and continue medication, Imparied fasting glucose and plan follow up labs, diet, and exercise, Hyperlipidemia with new medication plan, Seasonal allergies and is doing well on their medication PRN, Asthma and is well controlled on medication.   and Vitamin D deficiency and will update labs .

## 2020-03-02 RX ORDER — DILTIAZEM HYDROCHLORIDE 120 MG/1
CAPSULE, EXTENDED RELEASE ORAL
Qty: 30 CAPSULE | Refills: 5 | Status: SHIPPED | OUTPATIENT
Start: 2020-03-02 | End: 2020-08-24

## 2020-03-10 ENCOUNTER — RESULTS ENCOUNTER (OUTPATIENT)
Dept: FAMILY MEDICINE CLINIC | Facility: CLINIC | Age: 70
End: 2020-03-10

## 2020-03-10 DIAGNOSIS — Z86.79 HISTORY OF SUPRAVENTRICULAR TACHYCARDIA: ICD-10-CM

## 2020-03-10 DIAGNOSIS — E55.9 VITAMIN D DEFICIENCY: ICD-10-CM

## 2020-03-10 DIAGNOSIS — Z12.5 SCREENING PSA (PROSTATE SPECIFIC ANTIGEN): ICD-10-CM

## 2020-03-10 DIAGNOSIS — R73.01 IMPAIRED FASTING GLUCOSE: ICD-10-CM

## 2020-03-10 DIAGNOSIS — M1A.09X0 IDIOPATHIC CHRONIC GOUT OF MULTIPLE SITES WITHOUT TOPHUS: ICD-10-CM

## 2020-03-10 DIAGNOSIS — E78.2 MIXED HYPERLIPIDEMIA: ICD-10-CM

## 2020-03-10 DIAGNOSIS — I10 ESSENTIAL HYPERTENSION: ICD-10-CM

## 2020-05-06 ENCOUNTER — TELEPHONE (OUTPATIENT)
Dept: FAMILY MEDICINE CLINIC | Facility: CLINIC | Age: 70
End: 2020-05-06

## 2020-05-14 ENCOUNTER — OFFICE VISIT (OUTPATIENT)
Dept: FAMILY MEDICINE CLINIC | Facility: CLINIC | Age: 70
End: 2020-05-14

## 2020-05-14 DIAGNOSIS — I10 ESSENTIAL HYPERTENSION: Primary | ICD-10-CM

## 2020-05-14 PROCEDURE — 99442 PR PHYS/QHP TELEPHONE EVALUATION 11-20 MIN: CPT | Performed by: FAMILY MEDICINE

## 2020-05-14 RX ORDER — TELMISARTAN 40 MG/1
40 TABLET ORAL DAILY
Qty: 90 TABLET | Refills: 0 | Status: SHIPPED | OUTPATIENT
Start: 2020-05-14 | End: 2020-08-24

## 2020-05-14 NOTE — ASSESSMENT & PLAN NOTE
Hypertension is worsening.  Medication changes per orders.  Resume diltiazem 120 mg daily.  Continue telmisartan 40 mg.  Blood pressure will be reassessed at the next regular appointment.  (6 weeks)

## 2020-05-14 NOTE — PROGRESS NOTES
Mode of Visit: Telephone  You have chosen to receive care through a telephone visit today. Do you consent to use a telephone visit for your medical care today? Yes   Daniel Pineda confirmed that he was in [place] at the time of this telephonic visit.   The visit included telephone interaction. No technical issues occurred during this visit.   more than 10 minutes up to 20 minutes.   Subjective       Chief Complaint   Patient presents with   • Hypertension     2 month follow up B/P is still high 150/90         HPI:      Daniel Pineda is a 69 y.o. male who presents to  [address] [facility] today to recheck blood pressure.  His blood pressure is above goal.  2 weeks ago he stopped taking diltiazem and then resumed taking telmisartan 40 mg.  Previously he did tolerate diltiazem.  We will resume both diltiazem and continue telmisartan.  He is due for follow-up Medicare wellness and in 6 weeks we will recheck blood pressure and do Medicare wellness visit.    I have reviewed and updated his medications, medical history and problem list during today's office visit.     Social History     Tobacco Use   • Smoking status: Never Smoker   • Smokeless tobacco: Never Used   Substance Use Topics   • Alcohol use: Yes       Review of Systems      PE:   Objective   There were no vitals taken for this visit.    There is no height or weight on file to calculate BMI.    Physical Exam     Data Reviewed:                      A/P:     Assessment/Plan   Diagnoses and all orders for this visit:    1. Essential hypertension (Primary)  Assessment & Plan:  Hypertension is worsening.  Medication changes per orders.  Resume diltiazem 120 mg daily.  Continue telmisartan 40 mg.  Blood pressure will be reassessed at the next regular appointment.  (6 weeks)    Orders:  -     telmisartan (MICARDIS) 40 MG tablet; Take 1 tablet by mouth Daily for 90 days.  Dispense: 90 tablet; Refill: 0         Follow up:    Return in about 6 weeks (around 6/25/2020) for Medicare Wellness and regular visit, 30 minutes.

## 2020-08-06 DIAGNOSIS — I10 ESSENTIAL HYPERTENSION: ICD-10-CM

## 2020-08-07 RX ORDER — TELMISARTAN 40 MG/1
TABLET ORAL
Qty: 90 TABLET | Refills: 3 | OUTPATIENT
Start: 2020-08-07

## 2020-08-23 NOTE — PROGRESS NOTES
Date of Office Visit: 2020  Encounter Provider: Fatemeh Marmolejo MD  Place of Service: [facility] CARDIOLOGY  Patient Name: Daniel Pineda  :1950    Chief complaint  Accessible supraventricular tachycardia, hypertension    History of Present Illness  Patient is a 69-year-old female with history of hypertension, hyperlipidemia, sleep apnea (untreated) who was admitted in 2020 with supraventricular tachycardia and converted to sinus rhythm.  ST-T wave changes were abnormal.  An echocardiogram showed normal systolic function with an ejection fraction of 55%, grade 1 diastolic dysfunction, normal wall motion, mild to moderate mitral gravitation with aortic valve calcification without stenosis.  A stress perfusion study was negative for ischemia.  Seen in follow-up in 2020 he had no known recurrence.    He states he ran out of blood pressure medications 3 to 4 days ago.  He did have swelling and shortness of breath while on diltiazem and stopped this on his own in July.  He had one episode of dizziness 2 weeks ago so he stopped his medications on his own and blood pressures been elevated.  His edema has improved off diltiazem.  He does have sleep apnea which he is not treating    Past Medical History:   Diagnosis Date   • Allergic rhinitis    • Arthritis of multiple sites    • History of colonoscopy     about 5 yrs per pt   • Positional vertigo    • Screening for prostate cancer     been a while per pt     Past Surgical History:   Procedure Laterality Date   • JOINT REPLACEMENT Left    • LUNG SURGERY      left lower lobe removed at age 6    • REPLACEMENT TOTAL KNEE Left 2018   • TONSILLECTOMY       Outpatient Medications Prior to Visit   Medication Sig Dispense Refill   • ALLOPURINOL PO Take 300 mg by mouth Daily.     • atorvastatin (LIPITOR) 10 MG tablet Take 1 tablet by mouth Daily. For cholesterol 90 tablet 3   • fluticasone (FLONASE) 50 MCG/ACT nasal spray 1  spray into the nostril(s) as directed by provider Daily.     • loratadine (CLARITIN) 10 MG tablet Take 10 mg by mouth Daily.     • SYMBICORT 160-4.5 MCG/ACT inhaler Inhale 2 puffs 2 (Two) Times a Day.     • dilTIAZem (TIAZAC) 120 MG 24 hr capsule TAKE 1 CAPSULE BY MOUTH DAILY 30 capsule 5   • glucose blood test strip Use as instructed to test blood sugar once daily DX: E11.9 100 each 3   • telmisartan (MICARDIS) 40 MG tablet Take 1 tablet by mouth Daily for 90 days. 90 tablet 0     No facility-administered medications prior to visit.        Allergies as of 08/24/2020 - Reviewed 08/24/2020   Allergen Reaction Noted   • Other Anaphylaxis 01/29/2020   • Watermelon [citrullus vulgaris] Swelling 01/29/2020   • Penicillins  12/07/2015     Social History     Socioeconomic History   • Marital status:      Spouse name: Not on file   • Number of children: Not on file   • Years of education: Not on file   • Highest education level: Not on file   Tobacco Use   • Smoking status: Never Smoker   • Smokeless tobacco: Never Used   Substance and Sexual Activity   • Alcohol use: Yes   • Drug use: No   • Sexual activity: Not Currently     History reviewed. No pertinent family history.  Review of Systems   Constitution: Negative for fever, malaise/fatigue, weight gain and weight loss.   HENT: Negative for ear pain, hearing loss, nosebleeds and sore throat.    Eyes: Negative for double vision, pain, vision loss in left eye and vision loss in right eye.   Cardiovascular:        See history of present illness.   Respiratory: Negative for cough, shortness of breath, sleep disturbances due to breathing, snoring and wheezing.    Endocrine: Negative for cold intolerance, heat intolerance and polyuria.   Skin: Negative for itching, poor wound healing and rash.   Musculoskeletal: Positive for joint pain. Negative for joint swelling and myalgias.   Gastrointestinal: Negative for abdominal pain, diarrhea, hematochezia, nausea and vomiting.   "  Genitourinary: Negative for hematuria and hesitancy.   Neurological: Negative for numbness, paresthesias and seizures.   Psychiatric/Behavioral: Negative for depression. The patient is not nervous/anxious.         Objective:     Vitals:    08/24/20 1249   BP: (!) 192/88   Pulse: 67   Weight: 99.8 kg (220 lb)   Height: 177.8 cm (70\")     Body mass index is 31.57 kg/m².    Physical Exam   Constitutional: He is oriented to person, place, and time. He appears well-developed and well-nourished.   Obese   HENT:   Head: Normocephalic.   Nose: Nose normal.   Mouth/Throat: Oropharynx is clear and moist.   Eyes: Pupils are equal, round, and reactive to light. Conjunctivae and EOM are normal. Right eye exhibits no discharge. No scleral icterus.   Neck: Normal range of motion. Neck supple. No JVD present. No thyromegaly present.   Cardiovascular: Normal rate, regular rhythm, normal heart sounds and intact distal pulses. Exam reveals no gallop and no friction rub.   No murmur heard.  Pulses:       Carotid pulses are 2+ on the right side, and 2+ on the left side.       Radial pulses are 2+ on the right side, and 2+ on the left side.        Femoral pulses are 2+ on the right side, and 2+ on the left side.       Popliteal pulses are 2+ on the right side, and 2+ on the left side.        Dorsalis pedis pulses are 2+ on the right side, and 2+ on the left side.        Posterior tibial pulses are 2+ on the right side, and 2+ on the left side.   Pulmonary/Chest: Effort normal and breath sounds normal. No respiratory distress. He has no wheezes. He has no rales.   Abdominal: Soft. Bowel sounds are normal. He exhibits no distension. There is no hepatosplenomegaly. There is no tenderness. There is no rebound.   Musculoskeletal: Normal range of motion. He exhibits no edema or tenderness.   Neurological: He is alert and oriented to person, place, and time.   Skin: Skin is warm and dry. No rash noted. No erythema.   Psychiatric: He has a " normal mood and affect. His behavior is normal. Judgment and thought content normal.   Vitals reviewed.    Lab Review:     ECG 12 Lead  Date/Time: 8/24/2020 1:13 PM  Performed by: Fatemeh Marmolejo MD  Authorized by: Fatemeh Marmolejo MD   Comparison: compared with previous ECG   Similar to previous ECG  Rhythm: sinus rhythm    Clinical impression: normal ECG          Assessment:       Diagnosis Plan   1. Essential hypertension  ECG 12 Lead   2. Paroxysmal SVT (supraventricular tachycardia) (CMS/HCC)  ECG 12 Lead     Plan:       1.  Hypertension.  Sent a prescription for Micardis and he will follow-up with Dr. Rascon in 2 days.  In addition have started him on Lopressor 25 mg p.o. twice daily.  I have instructed him to call me if he develops any shortness of breath with this there is a beta selective agent.  In addition he will call with an update later this week or early next week regarding his blood pressure response.  2.  Paroxysmal supraventricular tachycardia, as above.  3.  Renal insufficiency.  He is to see Dr. Rascon later this week  4.  Hyperlipidemia       Your medication list           Accurate as of August 24, 2020 11:59 PM. If you have any questions, ask your nurse or doctor.               START taking these medications      Instructions Last Dose Given Next Dose Due   metoprolol tartrate 25 MG tablet  Commonly known as:  LOPRESSOR  Started by:  Fatemeh ROSADO. MD Jaleel      Take 1 tablet by mouth 2 (Two) Times a Day.          CONTINUE taking these medications      Instructions Last Dose Given Next Dose Due   ALLOPURINOL PO      Take 300 mg by mouth Daily.       atorvastatin 10 MG tablet  Commonly known as:  Lipitor      Take 1 tablet by mouth Daily. For cholesterol       fluticasone 50 MCG/ACT nasal spray  Commonly known as:  FLONASE      1 spray into the nostril(s) as directed by provider Daily.       loratadine 10 MG tablet  Commonly known as:  CLARITIN      Take 10 mg by mouth Daily.       Symbicort 160-4.5 MCG/ACT  inhaler  Generic drug:  budesonide-formoterol      Inhale 2 puffs 2 (Two) Times a Day.       telmisartan 40 MG tablet  Commonly known as:  Micardis      Take 1 tablet by mouth Daily.          STOP taking these medications    dilTIAZem 120 MG 24 hr capsule  Commonly known as:  TIAZAC  Stopped by:  Fatemeh Marmolejo MD        glucose blood test strip  Stopped by:  Fatemeh Marmolejo MD              Where to Get Your Medications      These medications were sent to [facility]/pharmacy #[id number] - [facility] [place] - [address] AT [facility] [phone] [facility] [phone]   [address] [facility] [address]    Phone:  [phone]   · metoprolol tartrate 25 MG tablet  · telmisartan 40 MG tablet         Patient is no longer taking -.  I corrected the med list to reflect this.  I did not stop these medications.    Dictated utilizing Dragon dictation

## 2020-08-24 ENCOUNTER — OFFICE VISIT (OUTPATIENT)
Dept: CARDIOLOGY | Facility: CLINIC | Age: 70
End: 2020-08-24

## 2020-08-24 VITALS
BODY MASS INDEX: 31.5 KG/M2 | HEART RATE: 67 BPM | HEIGHT: 70 IN | SYSTOLIC BLOOD PRESSURE: 192 MMHG | WEIGHT: 220 LBS | DIASTOLIC BLOOD PRESSURE: 88 MMHG

## 2020-08-24 DIAGNOSIS — I10 ESSENTIAL HYPERTENSION: Primary | ICD-10-CM

## 2020-08-24 DIAGNOSIS — I47.1 PAROXYSMAL SVT (SUPRAVENTRICULAR TACHYCARDIA) (HCC): ICD-10-CM

## 2020-08-24 PROCEDURE — 99213 OFFICE O/P EST LOW 20 MIN: CPT | Performed by: INTERNAL MEDICINE

## 2020-08-24 PROCEDURE — 93000 ELECTROCARDIOGRAM COMPLETE: CPT | Performed by: INTERNAL MEDICINE

## 2020-08-24 RX ORDER — TELMISARTAN 40 MG/1
40 TABLET ORAL DAILY
Qty: 90 TABLET | Refills: 0 | Status: SHIPPED | OUTPATIENT
Start: 2020-08-24 | End: 2020-08-26 | Stop reason: SDUPTHER

## 2020-08-26 ENCOUNTER — OFFICE VISIT (OUTPATIENT)
Dept: FAMILY MEDICINE CLINIC | Facility: CLINIC | Age: 70
End: 2020-08-26

## 2020-08-26 VITALS
DIASTOLIC BLOOD PRESSURE: 76 MMHG | HEIGHT: 70 IN | HEART RATE: 67 BPM | OXYGEN SATURATION: 95 % | TEMPERATURE: 97.8 F | SYSTOLIC BLOOD PRESSURE: 123 MMHG | RESPIRATION RATE: 16 BRPM | WEIGHT: 220 LBS | BODY MASS INDEX: 31.5 KG/M2

## 2020-08-26 DIAGNOSIS — I10 ESSENTIAL HYPERTENSION: ICD-10-CM

## 2020-08-26 DIAGNOSIS — I47.1 PAROXYSMAL SVT (SUPRAVENTRICULAR TACHYCARDIA) (HCC): ICD-10-CM

## 2020-08-26 DIAGNOSIS — R35.1 NOCTURIA: ICD-10-CM

## 2020-08-26 DIAGNOSIS — G83.89: ICD-10-CM

## 2020-08-26 DIAGNOSIS — Z00.00 MEDICARE ANNUAL WELLNESS VISIT, SUBSEQUENT: Primary | ICD-10-CM

## 2020-08-26 DIAGNOSIS — Z11.59 NEED FOR HEPATITIS C SCREENING TEST: ICD-10-CM

## 2020-08-26 DIAGNOSIS — E55.9 VITAMIN D DEFICIENCY: ICD-10-CM

## 2020-08-26 DIAGNOSIS — R73.01 IMPAIRED FASTING GLUCOSE: ICD-10-CM

## 2020-08-26 DIAGNOSIS — E78.2 MIXED HYPERLIPIDEMIA: ICD-10-CM

## 2020-08-26 PROBLEM — I47.10 PAROXYSMAL SVT (SUPRAVENTRICULAR TACHYCARDIA): Status: ACTIVE | Noted: 2020-08-26

## 2020-08-26 PROCEDURE — 99214 OFFICE O/P EST MOD 30 MIN: CPT | Performed by: FAMILY MEDICINE

## 2020-08-26 PROCEDURE — G0439 PPPS, SUBSEQ VISIT: HCPCS | Performed by: FAMILY MEDICINE

## 2020-08-26 PROCEDURE — 96160 PT-FOCUSED HLTH RISK ASSMT: CPT | Performed by: FAMILY MEDICINE

## 2020-08-26 RX ORDER — TELMISARTAN 40 MG/1
40 TABLET ORAL DAILY
Qty: 90 TABLET | Refills: 2 | Status: SHIPPED | OUTPATIENT
Start: 2020-08-26 | End: 2020-11-18

## 2020-08-26 RX ORDER — CLINDAMYCIN HYDROCHLORIDE 300 MG/1
CAPSULE ORAL
COMMUNITY
Start: 2020-08-14 | End: 2021-09-10 | Stop reason: SDUPTHER

## 2020-08-26 RX ORDER — ATORVASTATIN CALCIUM 10 MG/1
10 TABLET, FILM COATED ORAL DAILY
Qty: 90 TABLET | Refills: 2 | Status: SHIPPED | OUTPATIENT
Start: 2020-08-26 | End: 2021-03-01

## 2020-08-26 NOTE — PATIENT INSTRUCTIONS
Annual flu shot has been recommended to patient. Optimal timing of this vaccination is in mid October of each year.   Check on insurance coverage and cost for Shingrix (newest shingles vaccine) and get the immunization at your local pharmacy. It is more effective than the old Zostavax vaccine and is recommended even if you have had the Zostavax vaccine in the past. For more information, please look at the website below:    https://www.cdc.gov/vaccines/vpd/shingles/public/shingrix/index.html      Exercising to Stay Healthy  To become healthy and stay healthy, it is recommended that you do moderate-intensity and vigorous-intensity exercise. You can tell that you are exercising at a moderate intensity if your heart starts beating faster and you start breathing faster but can still hold a conversation. You can tell that you are exercising at a vigorous intensity if you are breathing much harder and faster and cannot hold a conversation while exercising.  Exercising regularly is important. It has many health benefits, such as:  · Improving overall fitness, flexibility, and endurance.  · Increasing bone density.  · Helping with weight control.  · Decreasing body fat.  · Increasing muscle strength.  · Reducing stress and tension.  · Improving overall health.  How often should I exercise?  Choose an activity that you enjoy, and set realistic goals. Your health care provider can help you make an activity plan that works for you.  Exercise regularly as told by your health care provider. This may include:  · Doing strength training two times a week, such as:  ? Lifting weights.  ? Using resistance bands.  ? Push-ups.  ? Sit-ups.  ? Yoga.  · Doing a certain intensity of exercise for a given amount of time. Choose from these options:  ? A total of 150 minutes of moderate-intensity exercise every week.  ? A total of 75 minutes of vigorous-intensity exercise every week.  ? A mix of moderate-intensity and vigorous-intensity  exercise every week.  Children, pregnant women, people who have not exercised regularly, people who are overweight, and older adults may need to talk with a health care provider about what activities are safe to do. If you have a medical condition, be sure to talk with your health care provider before you start a new exercise program.  What are some exercise ideas?  Moderate-intensity exercise ideas include:  · Walking 1 mile (1.6 km) in about 15 minutes.  · Biking.  · Hiking.  · Golfing.  · Dancing.  · Water aerobics.  Vigorous-intensity exercise ideas include:  · Walking 4.5 miles (7.2 km) or more in about 1 hour.  · Jogging or running 5 miles (8 km) in about 1 hour.  · Biking 10 miles (16.1 km) or more in about 1 hour.  · Lap swimming.  · Roller-skating or in-line skating.  · Cross-country skiing.  · Vigorous competitive sports, such as football, basketball, and soccer.  · Jumping rope.  · Aerobic dancing.  What are some everyday activities that can help me to get exercise?  · Yard work, such as:  ? Pushing a .  ? Raking and bagging leaves.  · Washing your car.  · Pushing a stroller.  · Shoveling snow.  · Gardening.  · Washing windows or floors.  How can I be more active in my day-to-day activities?  · Use stairs instead of an elevator.  · Take a walk during your lunch break.  · If you drive, park your car farther away from your work or school.  · If you take public transportation, get off one stop early and walk the rest of the way.  · Stand up or walk around during all of your indoor phone calls.  · Get up, stretch, and walk around every 30 minutes throughout the day.  · Enjoy exercise with a friend. Support to continue exercising will help you keep a regular routine of activity.  What guidelines can I follow while exercising?  · Before you start a new exercise program, talk with your health care provider.  · Do not exercise so much that you hurt yourself, feel dizzy, or get very short of  breath.  · Wear comfortable clothes and wear shoes with good support.  · Drink plenty of water while you exercise to prevent dehydration or heat stroke.  · Work out until your breathing and your heartbeat get faster.  Where to find more information  · [place] Department of Health and Human Services: www.hhs.gov  · Centers for Disease Control and Prevention (CDC): www.cdc.gov  Summary  · Exercising regularly is important. It will improve your overall fitness, flexibility, and endurance.  · Regular exercise also will improve your overall health. It can help you control your weight, reduce stress, and improve your bone density.  · Do not exercise so much that you hurt yourself, feel dizzy, or get very short of breath.  · Before you start a new exercise program, talk with your health care provider.  This information is not intended to replace advice given to you by your health care provider. Make sure you discuss any questions you have with your health care provider.  Document Released: 01/20/2012 Document Revised: 11/30/2018 Document Reviewed: 11/08/2018  [employer] Patient Education © 2020 [employer]      Calorie Counting for Weight Loss  Calories are units of energy. Your body needs a certain amount of calories from food to keep you going throughout the day. When you eat more calories than your body needs, your body stores the extra calories as fat. When you eat fewer calories than your body needs, your body burns fat to get the energy it needs.  Calorie counting means keeping track of how many calories you eat and drink each day. Calorie counting can be helpful if you need to lose weight. If you make sure to eat fewer calories than your body needs, you should lose weight. Ask your health care provider what a healthy weight is for you.  For calorie counting to work, you will need to eat the right number of calories in a day in order to lose a healthy amount of weight per week. A dietitian can help you determine how  many calories you need in a day and will give you suggestions on how to reach your calorie goal.  · A healthy amount of weight to lose per week is usually 1-2 lb (0.5-0.9 kg). This usually means that your daily calorie intake should be reduced by 500-750 calories.  · Eating 1,200 - 1,500 calories per day can help most women lose weight.  · Eating 1,500 - 1,800 calories per day can help most men lose weight.  What is my plan?  My goal is to have __________ calories per day.  If I have this many calories per day, I should lose around __________ pounds per week.  What do I need to know about calorie counting?  In order to meet your daily calorie goal, you will need to:  · Find out how many calories are in each food you would like to eat. Try to do this before you eat.  · Decide how much of the food you plan to eat.  · Write down what you ate and how many calories it had. Doing this is called keeping a food log.  To successfully lose weight, it is important to balance calorie counting with a healthy lifestyle that includes regular activity. Aim for 150 minutes of moderate exercise (such as walking) or 75 minutes of vigorous exercise (such as running) each week.  Where do I find calorie information?    The number of calories in a food can be found on a Nutrition Facts label. If a food does not have a Nutrition Facts label, try to look up the calories online or ask your dietitian for help.  Remember that calories are listed per serving. If you choose to have more than one serving of a food, you will have to multiply the calories per serving by the amount of servings you plan to eat. For example, the label on a package of bread might say that a serving size is 1 slice and that there are 90 calories in a serving. If you eat 1 slice, you will have eaten 90 calories. If you eat 2 slices, you will have eaten 180 calories.  How do I keep a food log?  Immediately after each meal, record the following information in your food  "log:  · What you ate. Don't forget to include toppings, sauces, and other extras on the food.  · How much you ate. This can be measured in cups, ounces, or number of items.  · How many calories each food and drink had.  · The total number of calories in the meal.  Keep your food log near you, such as in a small notebook in your pocket, or use a mobile karen or website. Some programs will calculate calories for you and show you how many calories you have left for the day to meet your goal.  What are some calorie counting tips?    · Use your calories on foods and drinks that will fill you up and not leave you hungry:  ? Some examples of foods that fill you up are nuts and nut butters, vegetables, lean proteins, and high-fiber foods like whole grains. High-fiber foods are foods with more than 5 g fiber per serving.  ? Drinks such as sodas, specialty coffee drinks, alcohol, and juices have a lot of calories, yet do not fill you up.  · Eat nutritious foods and avoid empty calories. Empty calories are calories you get from foods or beverages that do not have many vitamins or protein, such as candy, sweets, and soda. It is better to have a nutritious high-calorie food (such as an avocado) than a food with few nutrients (such as a bag of chips).  · Know how many calories are in the foods you eat most often. This will help you calculate calorie counts faster.  · Pay attention to calories in drinks. Low-calorie drinks include water and unsweetened drinks.  · Pay attention to nutrition labels for \"low fat\" or \"fat free\" foods. These foods sometimes have the same amount of calories or more calories than the full fat versions. They also often have added sugar, starch, or salt, to make up for flavor that was removed with the fat.  · Find a way of tracking calories that works for you. Get creative. Try different apps or programs if writing down calories does not work for you.  What are some portion control tips?  · Know how many " calories are in a serving. This will help you know how many servings of a certain food you can have.  · Use a measuring cup to measure serving sizes. You could also try weighing out portions on a kitchen scale. With time, you will be able to estimate serving sizes for some foods.  · Take some time to put servings of different foods on your favorite plates, bowls, and cups so you know what a serving looks like.  · Try not to eat straight from a bag or box. Doing this can lead to overeating. Put the amount you would like to eat in a cup or on a plate to make sure you are eating the right portion.  · Use smaller plates, glasses, and bowls to prevent overeating.  · Try not to multitask (for example, watch TV or use your computer) while eating. If it is time to eat, sit down at a table and enjoy your food. This will help you to know when you are full. It will also help you to be aware of what you are eating and how much you are eating.  What are tips for following this plan?  Reading food labels  · Check the calorie count compared to the serving size. The serving size may be smaller than what you are used to eating.  · Check the source of the calories. Make sure the food you are eating is high in vitamins and protein and low in saturated and trans fats.  Shopping  · Read nutrition labels while you shop. This will help you make healthy decisions before you decide to purchase your food.  · Make a grocery list and stick to it.  Cooking  · Try to cook your favorite foods in a healthier way. For example, try baking instead of frying.  · Use low-fat dairy products.  Meal planning  · Use more fruits and vegetables. Half of your plate should be fruits and vegetables.  · Include lean proteins like poultry and fish.  How do I count calories when eating out?  · Ask for smaller portion sizes.  · Consider sharing an entree and sides instead of getting your own entree.  · If you get your own entree, eat only half. Ask for a box at the  "beginning of your meal and put the rest of your entree in it so you are not tempted to eat it.  · If calories are listed on the menu, choose the lower calorie options.  · Choose dishes that include vegetables, fruits, whole grains, low-fat dairy products, and lean protein.  · Choose items that are boiled, broiled, grilled, or steamed. Stay away from items that are buttered, battered, fried, or served with cream sauce. Items labeled \"crispy\" are usually fried, unless stated otherwise.  · Choose water, low-fat milk, unsweetened iced tea, or other drinks without added sugar. If you want an alcoholic beverage, choose a lower calorie option such as a glass of wine or light beer.  · Ask for dressings, sauces, and syrups on the side. These are usually high in calories, so you should limit the amount you eat.  · If you want a salad, choose a garden salad and ask for grilled meats. Avoid extra toppings like mckinnon, cheese, or fried items. Ask for the dressing on the side, or ask for olive oil and vinegar or lemon to use as dressing.  · Estimate how many servings of a food you are given. For example, a serving of cooked rice is ½ cup or about the size of half a baseball. Knowing serving sizes will help you be aware of how much food you are eating at restaurants. The list below tells you how big or small some common portion sizes are based on everyday objects:  ? 1 oz--4 stacked dice.  ? 3 oz--1 deck of cards.  ? 1 tsp--1 die.  ? 1 Tbsp--½ a ping-pong ball.  ? 2 Tbsp--1 ping-pong ball.  ? ½ cup--½ baseball.  ? 1 cup--1 baseball.  Summary  · Calorie counting means keeping track of how many calories you eat and drink each day. If you eat fewer calories than your body needs, you should lose weight.  · A healthy amount of weight to lose per week is usually 1-2 lb (0.5-0.9 kg). This usually means reducing your daily calorie intake by 500-750 calories.  · The number of calories in a food can be found on a Nutrition Facts label. If a " food does not have a Nutrition Facts label, try to look up the calories online or ask your dietitian for help.  · Use your calories on foods and drinks that will fill you up, and not on foods and drinks that will leave you hungry.  · Use smaller plates, glasses, and bowls to prevent overeating.  This information is not intended to replace advice given to you by your health care provider. Make sure you discuss any questions you have with your health care provider.  Document Released: 12/18/2006 Document Revised: 09/06/2019 Document Reviewed: 11/17/2017  [employer] Patient Education © 2020 [employer]        Fall Prevention in the Home, Adult  Falls can cause injuries and can affect people from all age groups. There are many simple things that you can do to make your home safe and to help prevent falls. Ask for help when making these changes, if needed.  What actions can I take to prevent falls?  General instructions  · Use good lighting in all rooms. Replace any light bulbs that burn out.  · Turn on lights if it is dark. Use night-lights.  · Place frequently used items in easy-to-reach places. Lower the shelves around your home if necessary.  · Set up furniture so that there are clear paths around it. Avoid moving your furniture around.  · Remove throw rugs and other tripping hazards from the floor.  · Avoid walking on wet floors.  · Fix any uneven floor surfaces.  · Add color or contrast paint or tape to grab bars and handrails in your home. Place contrasting color strips on the first and last steps of stairways.  · When you use a stepladder, make sure that it is completely opened and that the sides are firmly locked. Have someone hold the ladder while you are using it. Do not climb a closed stepladder.  · Be aware of any and all pets.  What can I do in the bathroom?         · Keep the floor dry. Immediately clean up any water that spills onto the floor.  · Remove soap buildup in the tub or shower on a regular  basis.  · Use non-skid mats or decals on the floor of the tub or shower.  · Attach bath mats securely with double-sided, non-slip rug tape.  · If you need to sit down while you are in the shower, use a plastic, non-slip stool.  · Install grab bars by the toilet and in the tub and shower. Do not use towel bars as grab bars.  What can I do in the bedroom?  · Make sure that a bedside light is easy to reach.  · Do not use oversized bedding that drapes onto the floor.  · Have a firm chair that has side arms to use for getting dressed.  What can I do in the kitchen?  · Clean up any spills right away.  · If you need to reach for something above you, use a sturdy step stool that has a grab bar.  · Keep electrical cables out of the way.  · Do not use floor polish or wax that makes floors slippery. If you must use wax, make sure that it is non-skid floor wax.  What can I do in the stairways?  · Do not leave any items on the stairs.  · Make sure that you have a light switch at the top of the stairs and the bottom of the stairs. Have them installed if you do not have them.  · Make sure that there are handrails on both sides of the stairs. Fix handrails that are broken or loose. Make sure that handrails are as long as the stairways.  · Install non-slip stair treads on all stairs in your home.  · Avoid having throw rugs at the top or bottom of stairways, or secure the rugs with carpet tape to prevent them from moving.  · Choose a carpet design that does not hide the edge of steps on the stairway.  · Check any carpeting to make sure that it is firmly attached to the stairs. Fix any carpet that is loose or worn.  What can I do on the outside of my home?  · Use bright outdoor lighting.  · Regularly repair the edges of walkways and driveways and fix any cracks.  · Remove high doorway thresholds.  · Trim any shrubbery on the main path into your home.  · Regularly check that handrails are securely fastened and in good repair. Both  sides of any steps should have handrails.  · Install guardrails along the edges of any raised decks or porches.  · Clear walkways of debris and clutter, including tools and rocks.  · Have leaves, snow, and ice cleared regularly.  · Use sand or salt on walkways during winter months.  · In the garage, clean up any spills right away, including grease or oil spills.  What other actions can I take?  · Wear closed-toe shoes that fit well and support your feet. Wear shoes that have rubber soles or low heels.  · Use mobility aids as needed, such as canes, walkers, scooters, and crutches.  · Review your medicines with your health care provider. Some medicines can cause dizziness or changes in blood pressure, which increase your risk of falling.  Talk with your health care provider about other ways that you can decrease your risk of falls. This may include working with a physical therapist or  to improve your strength, balance, and endurance.  Where to find more information  · Centers for Disease Control and Prevention, [place]: https://www.cdc.gov  · National [place] on Aging: https://oi3iyqu.red.nih.gov  Contact a health care provider if:  · You are afraid of falling at home.  · You feel weak, drowsy, or dizzy at home.  · You fall at home.  Summary  · There are many simple things that you can do to make your home safe and to help prevent falls.  · Ways to make your home safe include removing tripping hazards and installing grab bars in the bathroom.  · Ask for help when making these changes in your home.  This information is not intended to replace advice given to you by your health care provider. Make sure you discuss any questions you have with your health care provider.  Document Released: 12/08/2003 Document Revised: 11/30/2018 Document Reviewed: 08/02/2018  Elsevier Patient Education © 2020 [employer]      Advance Directive    Advance directives are legal documents that let you make choices ahead of time  about your health care and medical treatment in case you become unable to communicate for yourself. Advance directives are a way for you to communicate your wishes to family, friends, and health care providers. This can help convey your decisions about end-of-life care if you become unable to communicate.  Discussing and writing advance directives should happen over time rather than all at once. Advance directives can be changed depending on your situation and what you want, even after you have signed the advance directives.  If you do not have an advance directive, some states assign family decision makers to act on your behalf based on how closely you are related to them. Each state has its own laws regarding advance directives. You may want to check with your health care provider, , or state representative about the laws in your state. There are different types of advance directives, such as:  · Medical power of .  · Living will.  · Do not resuscitate (DNR) or do not attempt resuscitation (DNAR) order.  Health care proxy and medical power of   A health care proxy, also called a health care agent, is a person who is appointed to make medical decisions for you in cases in which you are unable to make the decisions yourself. Generally, people choose someone they know well and trust to represent their preferences. Make sure to ask this person for an agreement to act as your proxy. A proxy may have to exercise judgment in the event of a medical decision for which your wishes are not known.  A medical power of  is a legal document that names your health care proxy. Depending on the laws in your state, after the document is written, it may also need to be:  · Signed.  · Notarized.  · Dated.  · Copied.  · Witnessed.  · Incorporated into your medical record.  You may also want to appoint someone to manage your financial affairs in a situation in which you are unable to do so. This is called  a durable power of  for finances. It is a separate legal document from the durable power of  for health care. You may choose the same person or someone different from your health care proxy to act as your agent in financial matters.  If you do not appoint a proxy, or if there is a concern that the proxy is not acting in your best interests, a court-appointed guardian may be designated to act on your behalf.  Living will  A living will is a set of instructions documenting your wishes about medical care when you cannot express them yourself. Health care providers should keep a copy of your living will in your medical record. You may want to give a copy to family members or friends. To alert caregivers in case of an emergency, you can place a card in your wallet to let them know that you have a living will and where they can find it. A living will is used if you become:  · Terminally ill.  · Incapacitated.  · Unable to communicate or make decisions.  Items to consider in your living will include:  · The use or non-use of life-sustaining equipment, such as dialysis machines and breathing machines (ventilators).  · A DNR or DNAR order, which is the instruction not to use cardiopulmonary resuscitation (CPR) if breathing or heartbeat stops.  · The use or non-use of tube feeding.  · Withholding of food and fluids.  · Comfort (palliative) care when the goal becomes comfort rather than a cure.  · Organ and tissue donation.  A living will does not give instructions for distributing your money and property if you should pass away. It is recommended that you seek the advice of a  when writing a will. Decisions about taxes, beneficiaries, and asset distribution will be legally binding. This process can relieve your family and friends of any concerns surrounding disputes or questions that may come up about the distribution of your assets.  DNR or DNAR  A DNR or DNAR order is a request not to have CPR in the  event that your heart stops beating or you stop breathing. If a DNR or DNAR order has not been made and shared, a health care provider will try to help any patient whose heart has stopped or who has stopped breathing. If you plan to have surgery, talk with your health care provider about how your DNR or DNAR order will be followed if problems occur.  Summary  · Advance directives are the legal documents that allow you to make choices ahead of time about your health care and medical treatment in case you become unable to communicate for yourself.  · The process of discussing and writing advance directives should happen over time. You can change the advance directives, even after you have signed them.  · Advance directives include DNR or DNAR orders, living stinson, and designating an agent as your medical power of .  This information is not intended to replace advice given to you by your health care provider. Make sure you discuss any questions you have with your health care provider.  Document Released: 03/26/2009 Document Revised: 01/22/2020 Document Reviewed: 11/06/2017  Elsevier Patient Education © 2020 [employer]

## 2020-08-26 NOTE — ASSESSMENT & PLAN NOTE
PSVT condition stable with current metoprolol.  Patient is only taking the medicine once daily and I reinforced that this should be a twice daily medicine.

## 2020-08-26 NOTE — PROGRESS NOTES
The ABCs of the Annual Wellness Visit  Subsequent Medicare Wellness Visit    Chief Complaint   Patient presents with   • Medicare Wellness-subsequent       Subjective   History of Present Illness:  Daniel Pineda is a 69 y.o. male who presents for a Subsequent Medicare Wellness Visit.  He is also here to refill medicines.  Labs are due.  Blood pressure is controlled.  He had run out of telmisartan and his blood pressure had elevated quite a bit but the cardiologist gave him a 30-day prescription.  Lipids are stable pending lab results.  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  Nocturia is stable.  PSA testing due.  Vitamin D deficiency stable pending lab results.  Overall he feels well.  He remains under specialty care for gout.  He also remains under specialty care for history of SVT and mild intermittent asthma.  Medication list has been updated during today's visit.  Preventative measures have been discussed during today's Medicare wellness visit.    HEALTH RISK ASSESSMENT    Recent Hospitalizations:  Recently treated at the following:  [facility] in January for SVT    Current Medical Providers:  Patient Care Team:  Basilio Rascon MD as PCP - General (Family Medicine)  Abby Read MD as Consulting Physician (Rheumatology)  Andre Chandler MD as Consulting Physician (Allergy and Immunology)  Rashad Anderson/MD Omer as Surgeon (Orthopedic Surgery)    Smoking Status:  Social History     Tobacco Use   Smoking Status Never Smoker   Smokeless Tobacco Never Used       Alcohol Consumption:  Social History     Substance and Sexual Activity   Alcohol Use Yes   • Alcohol/week: 10.0 standard drinks   • Types: 10 Cans of beer per week       Depression Screen:   PHQ-2/PHQ-9 Depression Screening 8/26/2020   Little interest or pleasure in doing things 0   Feeling down, depressed, or hopeless 0   Total Score 0       Fall Risk Screen:  STEADI Fall Risk Assessment was completed, and  patient is at LOW risk for falls.Assessment completed on:8/26/2020    Health Habits and Functional and Cognitive Screening:  Functional & Cognitive Status 8/26/2020   Do you have difficulty preparing food and eating? No   Do you have difficulty bathing yourself, getting dressed or grooming yourself? No   Do you have difficulty using the toilet? No   Do you have difficulty moving around from place to place? No   Do you have trouble with steps or getting out of a bed or a chair? No   Current Diet Well Balanced Diet   Dental Exam Up to date   Eye Exam Up to date   Exercise (times per week) 3 times per week   Current Exercise Activities Include Yard Work   Do you need help using the phone?  No   Are you deaf or do you have serious difficulty hearing?  No   Do you need help with transportation? No   Do you need help shopping? No   Do you need help preparing meals?  No   Do you need help with housework?  No   Do you need help with laundry? No   Do you need help taking your medications? No   Do you need help managing money? No   Do you ever drive or ride in a car without wearing a seat belt? No   Have you felt unusual stress, anger or loneliness in the last month? No   Who do you live with? Spouse   If you need help, do you have trouble finding someone available to you? No   Have you been bothered in the last four weeks by sexual problems? No   Do you have difficulty concentrating, remembering or making decisions? No         Does the patient have evidence of cognitive impairment? No    Asprin use counseling:Does not need ASA (and currently is not on it)    Age-appropriate Screening Schedule:  Refer to the list below for future screening recommendations based on patient's age, sex and/or medical conditions. Orders for these recommended tests are listed in the plan section. The patient has been provided with a written plan.    Health Maintenance   Topic Date Due   • INFLUENZA VACCINE  08/01/2020   • ZOSTER VACCINE (1 of 2)  05/23/2021 (Originally 9/26/2001)   • LIPID PANEL  01/29/2021   • COLONOSCOPY  03/04/2023   • TDAP/TD VACCINES (2 - Td) 03/22/2026          The following portions of the patient's history were reviewed and updated as appropriate: allergies, current medications, past family history, past medical history, past social history, past surgical history and problem list.    Outpatient Medications Prior to Visit   Medication Sig Dispense Refill   • ALLOPURINOL PO Take 300 mg by mouth Daily.     • fluticasone (FLONASE) 50 MCG/ACT nasal spray 1 spray into the nostril(s) as directed by provider Daily.     • loratadine (CLARITIN) 10 MG tablet Take 10 mg by mouth Daily.     • metoprolol tartrate (LOPRESSOR) 25 MG tablet Take 1 tablet by mouth 2 (Two) Times a Day. 60 tablet 11   • SYMBICORT 160-4.5 MCG/ACT inhaler Inhale 2 puffs 2 (Two) Times a Day.     • atorvastatin (LIPITOR) 10 MG tablet Take 1 tablet by mouth Daily. For cholesterol 90 tablet 3   • telmisartan (Micardis) 40 MG tablet Take 1 tablet by mouth Daily. 90 tablet 0   • clindamycin (CLEOCIN) 300 MG capsule TAKE TWO CAPSULES BY MOUTH one hour prior TO dental work       No facility-administered medications prior to visit.        Patient Active Problem List   Diagnosis   • Arthritis of multiple sites   • Positional vertigo   • Chronic nonseasonal allergic rhinitis due to pollen   • Mild intermittent asthma without complication   • Idiopathic chronic gout of multiple sites without tophus   • Essential hypertension   • Mixed hyperlipidemia   • Nocturia   • Vitamin D deficiency   • Impaired fasting glucose   • Morales Syndrome (CMS/[facility])   • History of supraventricular tachycardia   • History of sleep apnea   • Paroxysmal SVT (supraventricular tachycardia) (CMS/[facility])       Advanced Care Planning:  ACP discussion was held with the patient during this visit. Patient does not have an advance directive, information provided.    Review of Systems   Constitutional: Negative for  "fatigue.   Cardiovascular: Negative for chest pain.   Musculoskeletal: Positive for arthralgias (right knee pain).       Compared to one year ago, the patient feels his physical health is the same.  Compared to one year ago, the patient feels his mental health is the same.    Reviewed chart for potential of high risk medication in the elderly: yes  Reviewed chart for potential of harmful drug interactions in the elderly:yes    Objective         Vitals:    08/26/20 1359   BP: 123/76   Pulse: 67   Resp: 16   Temp: 97.8 °F (36.6 °C)   TempSrc: Tympanic   SpO2: 95%   Weight: 99.8 kg (220 lb)   Height: 177.8 cm (70\")   PainSc: 0-No pain       Body mass index is 31.57 kg/m².  Discussed the patient's BMI with him. The BMI is above average; BMI management plan is completed.    Physical Exam   Constitutional: He is cooperative. No distress.   Eyes: Conjunctivae and lids are normal.   Neck: Carotid bruit is not present. No tracheal deviation present.   Cardiovascular: Normal rate, regular rhythm and normal heart sounds.   No murmur heard.  Pulmonary/Chest: Effort normal and breath sounds normal.   Neurological: He is alert. He is not disoriented.   Skin: Skin is warm and dry.   Psychiatric: He has a normal mood and affect. His speech is normal and behavior is normal.   Vitals reviewed.            Assessment/Plan   Medicare Risks and Personalized Health Plan  CMS Preventative Services Quick Reference  Advance Directive Discussion  Chronic Pain   Fall Risk  Immunizations Discussed/Encouraged (specific immunizations; Influenza and Pneumococcal 23 )  Inactivity/Sedentary  Obesity/Overweight   Prostate Cancer Screening     The above risks/problems have been discussed with the patient.  Pertinent information has been shared with the patient in the After Visit Summary.  Follow up plans and orders are seen below in the Assessment/Plan Section.    Diagnoses and all orders for this visit:    1. Medicare annual wellness visit, " subsequent (Primary)  Comments:  Information on fall prevention, advanced directive, diet and exercise, and immunizations shared in after visit summary.    2. Mixed hyperlipidemia  Assessment & Plan:  Lipid abnormalities are unchanged.  Pharmacotherapy as ordered.  Lipids will be reassessed in 6 months.    Orders:  -     atorvastatin (Lipitor) 10 MG tablet; Take 1 tablet by mouth Daily for 270 days. For cholesterol  Dispense: 90 tablet; Refill: 2  -     Lipid Panel With / Chol / HDL Ratio; Future  -     CK; Future    3. Impaired fasting glucose  Assessment & Plan:  Impaired fasting glucose stable pending lab results.    Orders:  -     Comprehensive Metabolic Panel; Future    4. Morales Syndrome (CMS/HCC)  Assessment & Plan:  Condition stable.    Orders:  -     CBC & Differential; Future    5. Essential hypertension  Assessment & Plan:  Hypertension is improving with treatment.  Continue current treatment regimen.  Blood pressure will be reassessed at the next regular appointment.    Orders:  -     telmisartan (Micardis) 40 MG tablet; Take 1 tablet by mouth Daily for 270 days.  Dispense: 90 tablet; Refill: 2  -     Comprehensive Metabolic Panel; Future  -     CBC & Differential; Future  -     TSH; Future    6. Need for hepatitis C screening test  -     Hepatitis C Antibody; Future    7. Vitamin D deficiency  Assessment & Plan:  Will check vitamin D status with upcoming labs.      8. Nocturia  Assessment & Plan:  BPH symptoms stable.  Check PSA on current labs.      9. Paroxysmal SVT (supraventricular tachycardia) (CMS/HCC)  Assessment & Plan:  PSVT condition stable with current metoprolol.  Patient is only taking the medicine once daily and I reinforced that this should be a twice daily medicine.      Follow Up:  Return in about 6 months (around 2/26/2021) for Recheck/Medication.     An After Visit Summary and PPPS were given to the patient.

## 2020-09-01 DIAGNOSIS — E78.2 MIXED HYPERLIPIDEMIA: Primary | ICD-10-CM

## 2020-09-01 LAB
25(OH)D3+25(OH)D2 SERPL-MCNC: 28.5 NG/ML (ref 30–100)
ALBUMIN SERPL-MCNC: 4.2 G/DL (ref 3.5–5.2)
ALBUMIN/GLOB SERPL: 2.3 G/DL
ALP SERPL-CCNC: 74 U/L (ref 39–117)
ALT SERPL-CCNC: 29 U/L (ref 1–41)
AST SERPL-CCNC: 19 U/L (ref 1–40)
BILIRUB SERPL-MCNC: 0.5 MG/DL (ref 0–1.2)
BUN SERPL-MCNC: 11 MG/DL (ref 8–23)
BUN/CREAT SERPL: 11.6 (ref 7–25)
CALCIUM SERPL-MCNC: 9 MG/DL (ref 8.6–10.5)
CHLORIDE SERPL-SCNC: 105 MMOL/L (ref 98–107)
CHOLEST SERPL-MCNC: 226 MG/DL (ref 0–200)
CO2 SERPL-SCNC: 25.9 MMOL/L (ref 22–29)
CREAT SERPL-MCNC: 0.95 MG/DL (ref 0.76–1.27)
FOLATE SERPL-MCNC: 9.86 NG/ML (ref 4.78–24.2)
GLOBULIN SER CALC-MCNC: 1.8 GM/DL
GLUCOSE SERPL-MCNC: 115 MG/DL (ref 65–99)
HBA1C MFR BLD: 6 % (ref 4.8–5.6)
HDLC SERPL-MCNC: 36 MG/DL (ref 40–60)
LDLC SERPL CALC-MCNC: 143 MG/DL (ref 0–100)
POTASSIUM SERPL-SCNC: 4.3 MMOL/L (ref 3.5–5.2)
PROT SERPL-MCNC: 6 G/DL (ref 6–8.5)
PSA SERPL-MCNC: 2.53 NG/ML (ref 0–4)
SODIUM SERPL-SCNC: 140 MMOL/L (ref 136–145)
TRIGL SERPL-MCNC: 236 MG/DL (ref 0–150)
URATE SERPL-MCNC: 5.9 MG/DL (ref 3.4–7)
VIT B12 SERPL-MCNC: 281 PG/ML (ref 211–946)
VLDLC SERPL CALC-MCNC: 47.2 MG/DL

## 2020-09-01 RX ORDER — EZETIMIBE 10 MG/1
10 TABLET ORAL NIGHTLY
Qty: 90 TABLET | Refills: 2 | Status: SHIPPED | OUTPATIENT
Start: 2020-09-01 | End: 2021-03-01 | Stop reason: SDUPTHER

## 2020-09-08 ENCOUNTER — TELEPHONE (OUTPATIENT)
Dept: FAMILY MEDICINE CLINIC | Facility: CLINIC | Age: 70
End: 2020-09-08

## 2020-09-08 DIAGNOSIS — R79.9 ABNORMAL BLOOD CHEMISTRY: Primary | ICD-10-CM

## 2020-09-08 NOTE — TELEPHONE ENCOUNTER
----- Message from Basilio Rascon MD sent at 9/1/2020 12:42 PM EDT -----  Please call the patient regarding his abnormal result/s. Let him know that I have [facility]. He should continue with his atorvastatin and add this medication to it at bedtime. Set up repeat cmp, cbc, ck, lipid panel in 2 months.

## 2020-11-15 DIAGNOSIS — I10 ESSENTIAL HYPERTENSION: ICD-10-CM

## 2020-11-18 RX ORDER — TELMISARTAN 40 MG/1
TABLET ORAL
Qty: 90 TABLET | Refills: 2 | Status: SHIPPED | OUTPATIENT
Start: 2020-11-18 | End: 2021-02-22 | Stop reason: SDUPTHER

## 2021-02-22 ENCOUNTER — OFFICE VISIT (OUTPATIENT)
Dept: CARDIOLOGY | Facility: CLINIC | Age: 71
End: 2021-02-22

## 2021-02-22 VITALS
SYSTOLIC BLOOD PRESSURE: 104 MMHG | HEIGHT: 70 IN | HEART RATE: 65 BPM | DIASTOLIC BLOOD PRESSURE: 70 MMHG | WEIGHT: 229 LBS | BODY MASS INDEX: 32.78 KG/M2

## 2021-02-22 DIAGNOSIS — Z01.810 PREOP CARDIOVASCULAR EXAM: ICD-10-CM

## 2021-02-22 DIAGNOSIS — I10 ESSENTIAL HYPERTENSION: ICD-10-CM

## 2021-02-22 DIAGNOSIS — I47.1 PAROXYSMAL SVT (SUPRAVENTRICULAR TACHYCARDIA) (HCC): Primary | ICD-10-CM

## 2021-02-22 PROCEDURE — 93000 ELECTROCARDIOGRAM COMPLETE: CPT | Performed by: NURSE PRACTITIONER

## 2021-02-22 PROCEDURE — 99214 OFFICE O/P EST MOD 30 MIN: CPT | Performed by: NURSE PRACTITIONER

## 2021-02-22 RX ORDER — TELMISARTAN 40 MG/1
40 TABLET ORAL DAILY
Qty: 90 TABLET | Refills: 0 | Status: SHIPPED | OUTPATIENT
Start: 2021-02-22 | End: 2021-03-01 | Stop reason: SDUPTHER

## 2021-02-22 NOTE — PROGRESS NOTES
Date of Office Visit: 2021  Encounter Provider: Sara Bustos, SHAMIR, APRN  Place of Service: [facility] CARDIOLOGY  Patient Name: Daniel Pineda  :1950        Subjective:     Chief Complaint:  Supraventricular tachycardia, hypertension      History of Present Illness:  Daniel Pineda is a 70 y.o. male patient of Dr. Marmolejo.  I am seeing this patient in the office today and I reviewed his records.    Patient has a history of supraventricular tachycardia, hypertension, dyslipidemia, renal insufficiency, sleep apnea, history of lung resection in childhood.     Patient was admitted on 2020 after episode of diaphoresis, shortness of breath, and dizziness that occurred at work while walking.  EMS noted tachycardia that was felt to be SVT and he received adenosine, Lopressor, and fluids with improvement in his symptoms.  In the ER predominant rhythm was sinus rhythm with sinus tachycardia.  EKG showed nonspecific ST-T wave changes.  He had echocardiogram done 2020 showing normal left ventricular systolic function with EF of 55%, grade 1 diastolic dysfunction, normal left ventricular wall motion, moderate calcification of the aortic valve without regurgitation, mild to moderate mitral regurgitation.  Nuclear stress test 2020 showed normal myocardial perfusion imaging with no evidence of ischemia considered a low risk study and with normal heart rate and blood pressure response to stress.        Patient presents to office today for follow-up appointment.  Patient reports he is feeling well since last visit.  He is not doing formal exercise but he stays very active at work and walks 2 hours straight each evening.  Denies any exertional symptoms or concerns.  Denies chest pain or discomfort, shortness of breath, shortness of breath with exertion, palpitations, racing heartbeat sensation, lower extremity edema, dizziness, syncope, falls, fatigue, or abnormal bleeding.   Blood pressure on the lower side in the office today but he is asymptomatic.  He will monitor at home to ensure no high or low readings.  Of note, he is likely going to be getting a knee replacement soon.  He had this done a couple years ago without issues.          Past Medical History:   Diagnosis Date   • Allergic rhinitis    • Arthritis of multiple sites    • History of colonoscopy     about 5 yrs per pt   • Positional vertigo    • Screening for prostate cancer     been a while per pt     Past Surgical History:   Procedure Laterality Date   • JOINT REPLACEMENT Left    • LUNG SURGERY      left lower lobe removed at age 6    • REPLACEMENT TOTAL KNEE Left 08/2018   • TONSILLECTOMY       Outpatient Medications Prior to Visit   Medication Sig Dispense Refill   • ALLOPURINOL PO Take 300 mg by mouth Daily.     • atorvastatin (Lipitor) 10 MG tablet Take 1 tablet by mouth Daily for 270 days. For cholesterol 90 tablet 2   • clindamycin (CLEOCIN) 300 MG capsule TAKE TWO CAPSULES BY MOUTH one hour prior TO dental work     • ezetimibe (ZETIA) 10 MG tablet Take 1 tablet by mouth Every Night for 270 days. 90 tablet 2   • fluticasone (FLONASE) 50 MCG/ACT nasal spray 1 spray into the nostril(s) as directed by provider Daily.     • loratadine (CLARITIN) 10 MG tablet Take 10 mg by mouth Daily.     • SYMBICORT 160-4.5 MCG/ACT inhaler Inhale 2 puffs 2 (Two) Times a Day.     • metoprolol tartrate (LOPRESSOR) 25 MG tablet Take 1 tablet by mouth 2 (Two) Times a Day. 60 tablet 11   • telmisartan (MICARDIS) 40 MG tablet TAKE 1 TABLET BY MOUTH EVERY DAY 90 tablet 2     No facility-administered medications prior to visit.        Allergies as of 02/22/2021 - Reviewed 02/22/2021   Allergen Reaction Noted   • Other Anaphylaxis 01/29/2020   • Watermelon [citrullus vulgaris] Swelling 01/29/2020   • Penicillins  12/07/2015     Social History     Socioeconomic History   • Marital status:      Spouse name: Gideon   • Number of children: 1    "  • Years of education: Not on file   • Highest education level: Not on file   Occupational History   • Occupation: retired     Comment: [employer]   • Occupation: supplier representative now at [employer]   Tobacco Use   • Smoking status: Never Smoker   • Smokeless tobacco: Never Used   Substance and Sexual Activity   • Alcohol use: Yes     Alcohol/week: 10.0 standard drinks     Types: 10 Cans of beer per week   • Drug use: No   • Sexual activity: Not Currently   Lifestyle   • Physical activity     Days per week: 7 days     Minutes per session: Not on file   • Stress: Only a little     Family History   Problem Relation Age of Onset   • Dementia Mother    • Heart attack Brother 60   • Diabetes type II Brother    • Diabetes type I Brother          Review of Systems   Constitution: Negative for malaise/fatigue.   Cardiovascular: Negative for chest pain, dyspnea on exertion, leg swelling, near-syncope, palpitations and syncope.        SEE HPI   Respiratory: Negative for shortness of breath.    Hematologic/Lymphatic: Negative for bleeding problem.   Musculoskeletal: Positive for joint pain (Knee). Negative for falls.   Gastrointestinal: Negative for melena.   Genitourinary: Negative for hematuria.   Neurological: Negative for dizziness.   Psychiatric/Behavioral: Negative for altered mental status.             Objective:     Vitals:    02/22/21 1304   BP: 104/70   BP Location: Right arm   Cuff Size: Adult   Pulse: 65   Weight: 104 kg (229 lb)   Height: 177.8 cm (70\")     Body mass index is 32.86 kg/m².      PHYSICAL EXAM:  Constitutional:       General: Not in acute distress.     Appearance: Well-developed. Not diaphoretic.   Eyes:      Pupils: Pupils are equal, round, and reactive to light.   HENT:      Head: Normocephalic and atraumatic.   Neck:      Musculoskeletal: Neck supple.      Vascular: No carotid bruit or JVD.   Pulmonary:      Effort: Pulmonary effort is normal. No respiratory distress.      Breath sounds: Normal " breath sounds. No wheezing. No rales.   Cardiovascular:      Normal rate. Regular rhythm.      Murmurs: There is no murmur.      No gallop. No click. No rub.   Edema:     Peripheral edema absent.   Abdominal:      General: Bowel sounds are normal. There is no distension.      Palpations: Abdomen is soft.   Musculoskeletal: Normal range of motion.         General: No tenderness or deformity.   Skin:     General: Skin is warm and dry.      Findings: No erythema or rash.   Neurological:      Mental Status: Alert and oriented to person, place, and time.   Psychiatric:         Behavior: Behavior normal.         Judgment: Judgment normal.             ECG 12 Lead    Date/Time: 2/22/2021 1:33 PM  Performed by: Sara Bustos DNP, APRN  Authorized by: Sara Bustos DNP, MEGHAN   Comparison: compared with previous ECG from 8/24/2020  Rhythm: sinus rhythm  Rate: normal  BPM: 65  Comments: No significant changes from previous EKG              Assessment:       Diagnosis Plan   1. Paroxysmal SVT (supraventricular tachycardia) (CMS/HCC)  metoprolol tartrate (LOPRESSOR) 25 MG tablet   2. Essential hypertension  telmisartan (MICARDIS) 40 MG tablet    metoprolol tartrate (LOPRESSOR) 25 MG tablet   3. Preop cardiovascular exam           Plan:     1. Preop cardiovascular exam: Patient is likely going to be getting a knee replacement soon.  He had his other knee done a couple years ago without any issues.  He is staying very active and able to complete greater than 4 METS without any exertional symptoms or concerns.  No significant EKG changes noted in the office today.  He had a recent stress test 1/2020 without evidence of ischemia, considered low risk study.  From a cardiovascular standpoint he should be at acceptable risk to proceed.  2. Hypertension: Blood pressure on the lower side of normal in the office today, patient asymptomatic.  He denies any low blood pressure readings outside the office.  No dizziness or  lightheadedness.  He is going to monitor blood pressure at home and call for high or low readings.  3. Paroxysmal supraventricular tachycardia: Maintaining sinus rhythm in the office today.  Denies palpitations or racing heartbeat sensation.  4. Renal insufficiency: Follows with primary care provider.  5. Hyperlipidemia: PCP managing.    At this point patient is feeling well and his rhythm has remained stable.  He will follow-up with Dr. Marmolejo in 1 year or sooner if needed for any new, recurrent, or worsening symptoms or other issues or concerns.  Discussed in detail signs/symptoms that warrant sooner call or follow-up to the office.        Records reviewed including but not limited to 1/2020 echo, 1/2020 stress test, 8/31/2020 metabolic panel.           Your medication list          Accurate as of February 22, 2021  1:36 PM. If you have any questions, ask your nurse or doctor.            CONTINUE taking these medications      Instructions Last Dose Given Next Dose Due   ALLOPURINOL PO      Take 300 mg by mouth Daily.       atorvastatin 10 MG tablet  Commonly known as: Lipitor      Take 1 tablet by mouth Daily for 270 days. For cholesterol       clindamycin 300 MG capsule  Commonly known as: CLEOCIN      TAKE TWO CAPSULES BY MOUTH one hour prior TO dental work       ezetimibe 10 MG tablet  Commonly known as: ZETIA      Take 1 tablet by mouth Every Night for 270 days.       fluticasone 50 MCG/ACT nasal spray  Commonly known as: FLONASE      1 spray into the nostril(s) as directed by provider Daily.       loratadine 10 MG tablet  Commonly known as: CLARITIN      Take 10 mg by mouth Daily.       metoprolol tartrate 25 MG tablet  Commonly known as: LOPRESSOR      Take 1 tablet by mouth 2 (Two) Times a Day.       Symbicort 160-4.5 MCG/ACT inhaler  Generic drug: budesonide-formoterol      Inhale 2 puffs 2 (Two) Times a Day.       telmisartan 40 MG tablet  Commonly known as: MICARDIS      Take 1 tablet by mouth Daily.              Where to Get Your Medications      Information about where to get these medications is not yet available    Ask your nurse or doctor about these medications  · metoprolol tartrate 25 MG tablet  · telmisartan 40 MG tablet         The above medication changes may not have been made by this provider.  Patient's medication list was updated to reflect medications they are currently taking including medication changes made by other providers.            Thanks,    Sara Bustos, SHAMIR, APRN  02/22/2021         Dictated utilizing Dragon dictation

## 2021-03-01 ENCOUNTER — OFFICE VISIT (OUTPATIENT)
Dept: FAMILY MEDICINE CLINIC | Facility: CLINIC | Age: 71
End: 2021-03-01

## 2021-03-01 VITALS
HEART RATE: 60 BPM | RESPIRATION RATE: 16 BRPM | OXYGEN SATURATION: 95 % | WEIGHT: 229.4 LBS | HEIGHT: 70 IN | DIASTOLIC BLOOD PRESSURE: 78 MMHG | SYSTOLIC BLOOD PRESSURE: 114 MMHG | TEMPERATURE: 96.7 F | BODY MASS INDEX: 32.84 KG/M2

## 2021-03-01 DIAGNOSIS — E78.2 MIXED HYPERLIPIDEMIA: ICD-10-CM

## 2021-03-01 DIAGNOSIS — M1A.09X0 IDIOPATHIC CHRONIC GOUT OF MULTIPLE SITES WITHOUT TOPHUS: ICD-10-CM

## 2021-03-01 DIAGNOSIS — R35.1 NOCTURIA: ICD-10-CM

## 2021-03-01 DIAGNOSIS — E55.9 VITAMIN D DEFICIENCY: ICD-10-CM

## 2021-03-01 DIAGNOSIS — R73.01 IMPAIRED FASTING GLUCOSE: ICD-10-CM

## 2021-03-01 DIAGNOSIS — I10 ESSENTIAL HYPERTENSION: Primary | ICD-10-CM

## 2021-03-01 PROBLEM — Z01.810 PREOP CARDIOVASCULAR EXAM: Status: RESOLVED | Noted: 2021-02-22 | Resolved: 2021-03-01

## 2021-03-01 PROBLEM — M17.11 PRIMARY OSTEOARTHRITIS OF RIGHT KNEE: Status: ACTIVE | Noted: 2021-01-07

## 2021-03-01 PROCEDURE — 99214 OFFICE O/P EST MOD 30 MIN: CPT | Performed by: FAMILY MEDICINE

## 2021-03-01 RX ORDER — ATORVASTATIN CALCIUM 10 MG/1
10 TABLET, FILM COATED ORAL DAILY
Qty: 90 TABLET | Refills: 1 | Status: SHIPPED | OUTPATIENT
Start: 2021-03-01 | End: 2022-02-28

## 2021-03-01 RX ORDER — EZETIMIBE 10 MG/1
10 TABLET ORAL NIGHTLY
Qty: 90 TABLET | Refills: 1 | Status: SHIPPED | OUTPATIENT
Start: 2021-03-01 | End: 2022-02-28

## 2021-03-01 RX ORDER — ALLOPURINOL 300 MG/1
TABLET ORAL
COMMUNITY
Start: 2021-02-20 | End: 2022-02-28

## 2021-03-01 RX ORDER — MELATONIN
1000 DAILY
Qty: 30 TABLET | Refills: 11
Start: 2021-03-01 | End: 2022-03-01

## 2021-03-01 RX ORDER — TELMISARTAN 40 MG/1
40 TABLET ORAL DAILY
Qty: 90 TABLET | Refills: 1 | Status: SHIPPED | OUTPATIENT
Start: 2021-03-01 | End: 2022-01-03

## 2021-03-01 RX ORDER — IRBESARTAN 150 MG/1
TABLET ORAL
COMMUNITY
End: 2021-03-01

## 2021-03-01 RX ORDER — ATORVASTATIN CALCIUM 10 MG/1
10 TABLET, FILM COATED ORAL DAILY
COMMUNITY
Start: 2020-08-26 | End: 2021-03-01 | Stop reason: SDUPTHER

## 2021-03-01 RX ORDER — B-COMPLEX WITH VITAMIN C
1 TABLET ORAL DAILY
Qty: 30 EACH | Refills: 0
Start: 2021-03-01 | End: 2021-03-31

## 2021-03-01 RX ORDER — MULTIVIT WITH MINERALS/LUTEIN
1000 TABLET ORAL DAILY
Qty: 30 TABLET | Refills: 0
Start: 2021-03-01 | End: 2021-03-31

## 2021-03-01 RX ORDER — COLCHICINE 0.6 MG/1
0.6 TABLET, FILM COATED ORAL
COMMUNITY
Start: 2020-12-21

## 2021-03-01 NOTE — PROGRESS NOTES
"Chief Complaint  Hypertension (six month follow up) and Hyperlipidemia    Subjective          Daniel Pineda presents to [facility] to refill medications and review recent lab results. No medication side effects are reported. Overall the patient is feeling well.       Objective   Vital Signs:   /78   Pulse 60   Temp 96.7 °F (35.9 °C) (Tympanic)   Resp 16   Ht 177.8 cm (70\")   Wt 104 kg (229 lb 6.4 oz)   SpO2 95%   BMI 32.92 kg/m²     Physical Exam  Vitals signs reviewed.   Constitutional:       General: He is not in acute distress.  Eyes:      General: Lids are normal.      Conjunctiva/sclera: Conjunctivae normal.   Neck:      Vascular: No carotid bruit.      Trachea: No tracheal deviation.   Cardiovascular:      Rate and Rhythm: Normal rate and regular rhythm.      Heart sounds: Normal heart sounds. No murmur.   Pulmonary:      Effort: Pulmonary effort is normal.      Breath sounds: Normal breath sounds.   Skin:     General: Skin is warm and dry.   Neurological:      Mental Status: He is alert. He is not disoriented.   Psychiatric:         Speech: Speech normal.         Behavior: Behavior normal. Behavior is cooperative.          Result Review :       The data below has been reviewed by Basilio Rascon MD on 03/01/2021.  Lab Results - Last 18 Months   Lab Units 08/31/20  1049 01/29/20  0609 01/28/20  2030   GLUCOSE mg/dL 115*  --   --    BUN mg/dL 11 17 18   CREATININE mg/dL 0.95 1.18 1.53*   EGFR IF NONAFRICN AM mL/min/1.73 79 61 45*   EGFR IF AFRICN AM mL/min/1.73 96  --   --    SODIUM mmol/L 140 142 141   POTASSIUM mmol/L 4.3 3.8 3.6   CHLORIDE mmol/L 105 109* 106   CALCIUM mg/dL 9.0 8.2* 8.9   ALBUMIN g/dL 4.20  --  3.70   BILIRUBIN mg/dL 0.5  --  0.6   ALK PHOS U/L 74  --  59   AST (SGOT) U/L 19  --  17   ALT (SGPT) U/L 29  --  24   CHOLESTEROL mg/dL 226*  --   --    TRIGLYCERIDES mg/dL 236* 155*  --    HDL CHOL mg/dL 36* 24*  --    VLDL CHOL mg/dL  --  31  --    VLDL " CHOLESTEROL RODY mg/dL 47.2  --   --    LDL CHOL mg/dL 143* 116*  --    LDL/HDL RATIO   --  4.83  --    HEMOGLOBIN A1C % 6.00*  --   --    WBC 10*3/mm3  --   --  8.15   RBC 10*6/mm3  --   --  4.86   HEMATOCRIT %  --   --  45.0   MCV fL  --   --  92.6   MCH pg  --   --  32.3   TSH uIU/mL  --   --  1.140   FREE T4 ng/dL  --   --  1.56   PSA ng/mL 2.530  --   --    VIT D 25 HYDROXY ng/ml 28.5*  --   --    URIC ACID mg/dL 5.9  --   --                        Assessment and Plan    Diagnoses and all orders for this visit:    1. Essential hypertension (Primary)  Assessment & Plan:  Hypertension is unchanged.  Continue current treatment regimen.  Blood pressure will be reassessed at the next regular appointment.    Orders:  -     telmisartan (MICARDIS) 40 MG tablet; Take 1 tablet by mouth Daily for 180 days.  Dispense: 90 tablet; Refill: 1  -     Comprehensive Metabolic Panel; Future  -     CBC & Differential; Future  -     TSH; Future    2. Mixed hyperlipidemia  Assessment & Plan:  Lipid abnormalities are unchanged.  Pharmacotherapy as ordered.  Lipids will be reassessed in 6 months.    Orders:  -     atorvastatin (LIPITOR) 10 MG tablet; Take 1 tablet by mouth Daily for 180 days.  Dispense: 90 tablet; Refill: 1  -     ezetimibe (ZETIA) 10 MG tablet; Take 1 tablet by mouth Every Night for 180 days.  Dispense: 90 tablet; Refill: 1  -     Lipid Panel With / Chol / HDL Ratio; Future  -     CK; Future    3. Nocturia  Assessment & Plan:  Nocturia symptoms are stable. PSA will be monitored with annual labs.       Orders:  -     PSA DIAGNOSTIC; Future    4. Vitamin D deficiency  Assessment & Plan:  Check labs in 6 months    Orders:  -     Vitamin D 25 Hydroxy; Future  -     cholecalciferol (Vitamin D) 25 MCG (1000 UT) tablet; Take 1 tablet by mouth Daily.  Dispense: 30 tablet; Refill: 11    5. Impaired fasting glucose  Assessment & Plan:  Evaluate with labs    Orders:  -     Comprehensive Metabolic Panel; Future    6. Idiopathic  chronic gout of multiple sites without tophus  -     Uric Acid; Future    Other orders  -     ascorbic acid (VITAMIN C) 1000 MG tablet; Take 1 tablet by mouth Daily for 30 days.  Dispense: 30 tablet; Refill: 0  -     Zinc 100 MG tablet; Take 1 tablet by mouth Daily for 30 days.  Dispense: 30 each; Refill: 0      Follow Up   Return in about 6 months (around 9/1/2021) for Medicare Wellness and regular visit, 30 min.  Patient was given instructions and counseling regarding his condition or for health maintenance advice. Please see specific information pulled into the AVS if appropriate.

## 2021-03-01 NOTE — PATIENT INSTRUCTIONS
Pneumococcal 23 Vaccine is recommended to help prevent bacterial pneumonia. (one time only after age 65)

## 2021-03-19 ENCOUNTER — BULK ORDERING (OUTPATIENT)
Dept: CASE MANAGEMENT | Facility: OTHER | Age: 71
End: 2021-03-19

## 2021-03-19 DIAGNOSIS — Z23 IMMUNIZATION DUE: ICD-10-CM

## 2021-06-22 ENCOUNTER — OFFICE VISIT (OUTPATIENT)
Dept: FAMILY MEDICINE CLINIC | Facility: CLINIC | Age: 71
End: 2021-06-22

## 2021-06-22 VITALS
DIASTOLIC BLOOD PRESSURE: 68 MMHG | BODY MASS INDEX: 32.5 KG/M2 | SYSTOLIC BLOOD PRESSURE: 152 MMHG | OXYGEN SATURATION: 96 % | HEIGHT: 70 IN | WEIGHT: 227 LBS | HEART RATE: 66 BPM | TEMPERATURE: 97.5 F | RESPIRATION RATE: 16 BRPM

## 2021-06-22 DIAGNOSIS — Z01.818 PREOP EXAMINATION: Primary | ICD-10-CM

## 2021-06-22 PROBLEM — M25.561 PAIN IN RIGHT KNEE: Status: ACTIVE | Noted: 2018-11-16

## 2021-06-22 PROCEDURE — 99213 OFFICE O/P EST LOW 20 MIN: CPT | Performed by: PHYSICIAN ASSISTANT

## 2021-06-22 RX ORDER — MELATONIN
1000 DAILY
COMMUNITY
Start: 2021-03-01 | End: 2022-02-28 | Stop reason: SDUPTHER

## 2021-06-22 NOTE — PROGRESS NOTES
"Subjective   Daniel Pineda is a 70 y.o. male.     History of Present Illness     Daneil Pineda 70 y.o. male /68 (BP Location: Left arm, Patient Position: Sitting, Cuff Size: Adult)   Pulse 66   Temp 97.5 °F (36.4 °C)   Resp 16   Ht 177.8 cm (70\")   Wt 103 kg (227 lb)   SpO2 96%   BMI 32.57 kg/m²  who presents today for pre op clearance.  he has a history of   Patient Active Problem List   Diagnosis   • Arthritis of multiple sites   • Positional vertigo   • Chronic nonseasonal allergic rhinitis due to pollen   • Mild intermittent asthma without complication   • Idiopathic chronic gout of multiple sites without tophus   • Essential hypertension   • Mixed hyperlipidemia   • Nocturia   • Vitamin D deficiency   • Impaired fasting glucose   • Morales Syndrome (CMS/HCC)   • History of supraventricular tachycardia   • History of sleep apnea   • Paroxysmal SVT (supraventricular tachycardia) (CMS/HCC)   • Primary osteoarthritis of right knee   • Pain in right knee   .      Having knee replaced---Dr Anderson    Had EKG 2-22-21 with cardio and no change  Sees cardio DR Marmolejo for   Plan:      1. Preop cardiovascular exam: Patient is likely going to be getting a knee replacement soon.  He had his other knee done a couple years ago without any issues.  He is staying very active and able to complete greater than 4 METS without any exertional symptoms or concerns.  No significant EKG changes noted in the office today.  He had a recent stress test 1/2020 without evidence of ischemia, considered low risk study.  From a cardiovascular standpoint he should be at acceptable risk to proceed.  2. Hypertension: Blood pressure on the lower side of normal in the office today, patient asymptomatic.  He denies any low blood pressure readings outside the office.  No dizziness or lightheadedness.  He is going to monitor blood pressure at home and call for high or low readings.  3. Paroxysmal supraventricular tachycardia: Maintaining " sinus rhythm in the office today.  Denies palpitations or racing heartbeat sensation.  4. Renal insufficiency: Follows with primary care provider.  5. Hyperlipidemia: PCP managing.    Sees DR KAYLIN Chandler for asthma--not using rescue inhaler >2 times a week    The following portions of the patient's history were reviewed and updated as appropriate: allergies, current medications, past family history, past medical history, past social history, past surgical history and problem list.    Review of Systems    Objective   Physical Exam  Vitals and nursing note reviewed.   Constitutional:       General: He is not in acute distress.     Appearance: He is well-developed. He is obese. He is not diaphoretic.   HENT:      Head: Normocephalic.      Right Ear: External ear normal.      Left Ear: External ear normal.   Eyes:      Conjunctiva/sclera: Conjunctivae normal.      Pupils: Pupils are equal, round, and reactive to light.   Neck:      Vascular: No carotid bruit.   Cardiovascular:      Rate and Rhythm: Normal rate and regular rhythm.      Pulses: Normal pulses.      Heart sounds: Normal heart sounds. No murmur heard.     Pulmonary:      Effort: Pulmonary effort is normal.      Breath sounds: Normal breath sounds.   Musculoskeletal:         General: Normal range of motion.      Cervical back: Normal range of motion and neck supple.      Right lower leg: Edema present.      Left lower leg: Edema present.   Skin:     General: Skin is warm and dry.      Findings: No rash.   Neurological:      Mental Status: He is alert and oriented to person, place, and time.   Psychiatric:         Mood and Affect: Mood normal. Affect is not inappropriate.         Behavior: Behavior normal.         Thought Content: Thought content normal.         Judgment: Judgment normal.         Assessment/Plan   Diagnoses and all orders for this visit:    1. Preop examination (Primary)        F/u Dr Rascon----HTN stable  Borderline DMII  Already has cardiac  clearance  Note sent ortho       Answers for HPI/ROS submitted by the patient on 6/22/2021  What is the primary reason for your visit?: Other  Please describe your symptoms.: Pre Op Clearance.  Have you had these symptoms before?: Yes  How long have you been having these symptoms?: Greater than 2 weeks  Please list any medications you are currently taking for this condition.: none  Please describe any probable cause for these symptoms. : none

## 2021-08-03 ENCOUNTER — TELEPHONE (OUTPATIENT)
Dept: CARDIOLOGY | Facility: CLINIC | Age: 71
End: 2021-08-03

## 2021-08-03 NOTE — TELEPHONE ENCOUNTER
----- Message from Nica Saucedo sent at 8/3/2021  3:22 PM EDT -----  Taylor from [facility] is requesting cardiac clearance for a patient's upcoming knee surgery.  Fariha saw the patient in Feb 2021 and cleared him but that was six months ago.  Just checking if clearance was still good.  Her fax is [phone].  Phone is [phone].  Karine

## 2021-08-04 NOTE — TELEPHONE ENCOUNTER
Called to discuss with patient but no answer. If patient is still active without any cardiac symptoms or concerns then should still be fine to proceed, would want to see the EKG from preadmission testing just to confirm no significant changes.

## 2021-08-05 NOTE — TELEPHONE ENCOUNTER
Called and spoke with patient.  He is walking 2-3 miles a night. Also still active with yardwork.  No chest pain or discomfort, no shortness of breath with exertion, no palpitations, no other cardiac symptoms or concerns.  Blood pressure has been well controlled at home.  He reports he had an EKG through preop testing on Tuesday.      lEiz----   can you please request a copy of the EKG he had on Tuesday through preop testing so I can review it.  If no significant changes then will clear to proceed (Oralia has left for the week).

## 2021-08-09 ENCOUNTER — OFFICE VISIT (OUTPATIENT)
Dept: FAMILY MEDICINE CLINIC | Facility: CLINIC | Age: 71
End: 2021-08-09

## 2021-08-09 VITALS
OXYGEN SATURATION: 97 % | BODY MASS INDEX: 32.5 KG/M2 | HEIGHT: 70 IN | RESPIRATION RATE: 16 BRPM | DIASTOLIC BLOOD PRESSURE: 72 MMHG | WEIGHT: 227 LBS | HEART RATE: 62 BPM | SYSTOLIC BLOOD PRESSURE: 138 MMHG | TEMPERATURE: 98.2 F

## 2021-08-09 DIAGNOSIS — Z01.818 PREOP EXAMINATION: Primary | ICD-10-CM

## 2021-08-09 DIAGNOSIS — M17.11 PRIMARY OSTEOARTHRITIS OF RIGHT KNEE: ICD-10-CM

## 2021-08-09 PROBLEM — M17.0 PRIMARY OSTEOARTHRITIS OF BOTH KNEES: Status: ACTIVE | Noted: 2021-08-09

## 2021-08-09 PROBLEM — K21.9 GASTROESOPHAGEAL REFLUX DISEASE: Status: ACTIVE | Noted: 2021-08-09

## 2021-08-09 PROCEDURE — 99214 OFFICE O/P EST MOD 30 MIN: CPT | Performed by: FAMILY MEDICINE

## 2021-08-09 NOTE — PROGRESS NOTES
"Answers for HPI/ROS submitted by the patient on 8/2/2021  What is the primary reason for your visit?: Other  Please describe your symptoms.: Pre op for TKA  Have you had these symptoms before?: Yes  How long have you been having these symptoms?: Greater than 2 weeks  Please describe any probable cause for these symptoms. : Arthritis    Chief Complaint  Right knee surgery (pt having surgery on Monday.  Pt is following up on labs.  )    Cesar Sanchez presents to [facility] for preoperative examination prior to total knee replacement on the right.  That surgery is scheduled for Monday.  Labs done at the [facility] will have been reviewed and are satisfactory.  Nonfasting blood sugar was 136.  Hemoglobin A1c 6.2.  Remainder of the labs are satisfactory.  Mild vitamin D deficiency was noted with a level of 27.  He did not have any problems with previous left knee surgery that was done almost 3 years ago in August.      Review of Systems   Musculoskeletal: Positive for arthralgias.   All other systems reviewed and are negative.       Objective   Vital Signs:   Vitals:    08/09/21 1101 08/09/21 1124   BP: 162/87 138/72   BP Location:  Right arm   Patient Position:  Sitting   Cuff Size:  Adult   Pulse: 62    Resp: 16    Temp: 98.2 °F (36.8 °C)    TempSrc: Skin    SpO2: 97%    Weight: 103 kg (227 lb)    Height: 177.8 cm (70\")         Physical Exam  Vitals reviewed.   Constitutional:       General: He is not in acute distress.  Eyes:      General: Lids are normal.      Conjunctiva/sclera: Conjunctivae normal.   Neck:      Vascular: No carotid bruit.      Trachea: No tracheal deviation.   Cardiovascular:      Rate and Rhythm: Normal rate and regular rhythm.      Heart sounds: Normal heart sounds. No murmur heard.     Pulmonary:      Effort: Pulmonary effort is normal.      Breath sounds: Normal breath sounds.   Skin:     General: Skin is warm and dry.   Neurological:      Mental " Status: He is alert. He is not disoriented.   Psychiatric:         Speech: Speech normal.         Behavior: Behavior normal. Behavior is cooperative.          Result Review :                Assessment and Plan    Diagnoses and all orders for this visit:    1. Preop examination (Primary)  Comments:  cleared for surgery    2. Primary osteoarthritis of right knee  Comments:  plan TKA Monday        Follow Up   Return in about 3 months (around 11/9/2021) for recheck/refill medication.  Patient was given instructions and counseling regarding his condition or for health maintenance advice. Please see specific information pulled into the AVS if appropriate.

## 2021-08-09 NOTE — TELEPHONE ENCOUNTER
Please let patient know that EKG 8/3/2021 from preadmission testing looks similar to previous EKGs, no concerning changes.  Given recent normal stress test and lack of EKG changes and lack of cardiac symptoms had a good workload will clear to proceed with knee surgery at increased but nonprohibitive risk.  Have placed letter in epic for Dr. Anderson if we can please fax?

## 2021-10-04 ENCOUNTER — OFFICE VISIT (OUTPATIENT)
Dept: FAMILY MEDICINE CLINIC | Facility: CLINIC | Age: 71
End: 2021-10-04

## 2021-10-04 VITALS
HEIGHT: 70 IN | SYSTOLIC BLOOD PRESSURE: 130 MMHG | TEMPERATURE: 97.5 F | RESPIRATION RATE: 18 BRPM | BODY MASS INDEX: 30.21 KG/M2 | WEIGHT: 211 LBS | HEART RATE: 78 BPM | DIASTOLIC BLOOD PRESSURE: 78 MMHG | OXYGEN SATURATION: 97 %

## 2021-10-04 DIAGNOSIS — N45.1 EPIDIDYMITIS, RIGHT: Primary | ICD-10-CM

## 2021-10-04 PROCEDURE — 99213 OFFICE O/P EST LOW 20 MIN: CPT | Performed by: FAMILY MEDICINE

## 2021-10-04 RX ORDER — TRAMADOL HYDROCHLORIDE 50 MG/1
50 TABLET ORAL EVERY 4 HOURS PRN
COMMUNITY
Start: 2021-08-16 | End: 2022-02-28

## 2021-10-04 RX ORDER — LEVOFLOXACIN 500 MG/1
500 TABLET, FILM COATED ORAL DAILY
Qty: 10 TABLET | Refills: 0 | Status: SHIPPED | OUTPATIENT
Start: 2021-10-04 | End: 2021-10-14

## 2021-10-04 RX ORDER — ASPIRIN 81 MG/1
81 TABLET, COATED ORAL DAILY
COMMUNITY
Start: 2021-08-16 | End: 2022-02-28

## 2021-10-04 RX ORDER — PREGABALIN 75 MG/1
75 CAPSULE ORAL 2 TIMES DAILY
COMMUNITY
Start: 2021-08-16 | End: 2022-02-28

## 2021-10-04 NOTE — ASSESSMENT & PLAN NOTE
(new problem to this provider)  Levofloxacin 5 mg daily for 10 days.  Urgent referral to urology.  Continue with heating pad 20 minutes twice daily as needed.

## 2021-10-04 NOTE — PROGRESS NOTES
"Chief Complaint  Swollen testicle and Back Pain    Subjective          Daniel presents to [facility] with recent UTI symptoms.  He was treated with sulfa antibiotics for 10 days and initially got better but it has come back.  He is not currently running fever.  Right now his right scrotum is tender and swollen.  No painful urination.  Increased urinary frequency.  Recently had an MRI which did show enlarged prostate.  Patient does not have a urologist currently.  Urinalysis and culture from recent ED care center been reviewed.  The culture was negative for bacterial growth.          Objective   Vital Signs:   Vitals:    10/04/21 1430   BP: 130/78   Pulse: 78   Resp: 18   Temp: 97.5 °F (36.4 °C)   TempSrc: Skin   SpO2: 97%   Weight: 95.7 kg (211 lb)   Height: 177.8 cm (70\")        Physical Exam  Constitutional:       General: He is not in acute distress.  Genitourinary:      Neurological:      Mental Status: He is alert.          Result Review :                Assessment and Plan    Diagnoses and all orders for this visit:    1. Epididymitis, right (Primary)  Assessment & Plan:  (new problem to this provider)  Levofloxacin 5 mg daily for 10 days.  Urgent referral to urology.  Continue with heating pad 20 minutes twice daily as needed.    Orders:  -     levoFLOXacin (LEVAQUIN) 500 MG tablet; Take 1 tablet by mouth Daily for 10 days.  Dispense: 10 tablet; Refill: 0  -     Ambulatory Referral to Urology      Follow Up   Return if symptoms worsen or fail to improve.  Patient was given instructions and counseling regarding his condition or for health maintenance advice. Please see specific information pulled into the AVS if appropriate.   "

## 2021-10-19 ENCOUNTER — TELEPHONE (OUTPATIENT)
Dept: FAMILY MEDICINE CLINIC | Facility: CLINIC | Age: 71
End: 2021-10-19

## 2021-10-19 NOTE — TELEPHONE ENCOUNTER
Caller: Daniel Pineda    Relationship: Self    Best call back number: [phone]     What medication are you requesting: ANTIBIOTICS  sulfamethoxazole-trimethoprim (Bactrim DS) 800-160 MG per tablet    What are your current symptoms: PAIN LOWER ABDOMIN     How long have you been experiencing symptoms: A WHILE   Have you had these symptoms before:    [] Yes  [x] No    Have you been treated for these symptoms before:   [] Yes  [x] No    If a prescription is needed, what is your preferred pharmacy and phone number:  [facility] - [place], [place] - [address] [address] - [phone]  - [phone]   [phone]    Additional notes: PATIENT STATED PAIN HAS RETURNED

## 2021-10-20 NOTE — TELEPHONE ENCOUNTER
Pt was informed.  Pt states he woke up this morning and feels much better.  He states he will call back if he starts having an issue.  Thanks

## 2021-10-20 NOTE — TELEPHONE ENCOUNTER
Patient will need an appointment for refill of antibiotics.  Please call him to schedule his appointment as soon as possible.

## 2022-01-01 DIAGNOSIS — I10 ESSENTIAL HYPERTENSION: ICD-10-CM

## 2022-01-03 RX ORDER — TELMISARTAN 40 MG/1
TABLET ORAL
Qty: 90 TABLET | Refills: 1 | Status: SHIPPED | OUTPATIENT
Start: 2022-01-03 | End: 2022-07-05

## 2022-02-26 DIAGNOSIS — I10 ESSENTIAL (PRIMARY) HYPERTENSION: ICD-10-CM

## 2022-02-26 DIAGNOSIS — I47.1 SUPRAVENTRICULAR TACHYCARDIA: ICD-10-CM

## 2022-02-28 ENCOUNTER — OFFICE VISIT (OUTPATIENT)
Dept: CARDIOLOGY | Facility: CLINIC | Age: 72
End: 2022-02-28

## 2022-02-28 VITALS
BODY MASS INDEX: 33.21 KG/M2 | HEIGHT: 70 IN | SYSTOLIC BLOOD PRESSURE: 120 MMHG | DIASTOLIC BLOOD PRESSURE: 84 MMHG | WEIGHT: 232 LBS | HEART RATE: 55 BPM

## 2022-02-28 DIAGNOSIS — I47.1 SUPRAVENTRICULAR TACHYCARDIA: Primary | ICD-10-CM

## 2022-02-28 DIAGNOSIS — I10 ESSENTIAL (PRIMARY) HYPERTENSION: ICD-10-CM

## 2022-02-28 PROCEDURE — 93000 ELECTROCARDIOGRAM COMPLETE: CPT | Performed by: NURSE PRACTITIONER

## 2022-02-28 PROCEDURE — 99214 OFFICE O/P EST MOD 30 MIN: CPT | Performed by: NURSE PRACTITIONER

## 2022-02-28 RX ORDER — TAMSULOSIN HYDROCHLORIDE 0.4 MG/1
1 CAPSULE ORAL DAILY
COMMUNITY

## 2022-02-28 NOTE — PROGRESS NOTES
[facility]   [address], [address]  [place], [place], [address]    [phone]  [email or web address] [email or web address]           OUTPATIENT CLINIC FOLLOW UP NOTE    Patient Care Team:  Patient Care Team:  Basilio Rascon MD as PCP - General (Family Medicine)  Jacek, Abby Mijares MD as Consulting Physician (Rheumatology)  Andre Chandler MD as Consulting Physician (Allergy and Immunology)  Rashad Anderson/MD Omer as Surgeon (Orthopedic Surgery)    Subjective:      Chief Complaint   Patient presents with   • Hypertension       HPI:    Daniel Pineda is a 71 y.o. male.  Problem list:  1. Paroxysmal SVT  a. Initially diagnosed 1/2020.  b. TTE 1/2020: Normal LV systolic function with EF of 55%, grade 1 diastolic dysfunction, normal left ventricular wall motion, moderate calcification of the aortic valve, mild to moderate MR.  c. Nuclear stress test 1/2020: Normal myocardial perfusion study with no evidence of ischemia.  2. Hypertension  3. Hyperlipidemia  4. CKD  5. Obstructive sleep apnea  6. History of lung resection in childhood  7. COVID-19 in 2020 and again in 1/2022    The patient presents today for follow-up.  His primary cardiologist is Dr. Marmolejo.  He was last seen by MEGHAN Lee in 2/2021.    Since the patient was last seen he reports that he has had right knee replacement in August.  He denies chest pain, shortness of breath, palpitations, lightheadedness, or syncope.  Has complaints of right lower extremity edema that initially occurred after surgery.  He notes this improved but has been recurrent over the past month.  Does seem to improve with elevation.  He denies pain or tenderness.  He also denies any recent long car trips or plane rides.  He also notes he had COVID-19 last month and believes he has recovered since then.    Review of Systems:  Positive for lower extremity edema  Negative for exertional chest pain, dyspnea with exertion, palpitations, syncope.      PFSH:  Patient Active Problem List   Diagnosis   • Arthritis of multiple sites   • Positional vertigo   • Chronic nonseasonal allergic rhinitis due to pollen   • Mild intermittent asthma without complication   • Idiopathic chronic gout of multiple sites without tophus   • Essential hypertension   • Mixed hyperlipidemia   • Nocturia   • Vitamin D deficiency   • Impaired fasting glucose   • Morales Syndrome (HCC)   • History of supraventricular tachycardia   • History of sleep apnea   • Paroxysmal SVT (supraventricular tachycardia) (HCC)   • Primary osteoarthritis of right knee   • Pain in right knee   • Gastroesophageal reflux disease   • Primary osteoarthritis of both knees   • Epididymitis, right         Current Outpatient Medications:   •  ascorbic acid (VITAMIN C) 100 MG tablet, Take  by mouth Daily., Disp: , Rfl:   •  budesonide-formoterol (SYMBICORT) 160-4.5 MCG/ACT inhaler, Daily., Disp: , Rfl:   •  cholecalciferol (Vitamin D) 25 MCG (1000 UT) tablet, Take 1 tablet by mouth Daily., Disp: 30 tablet, Rfl: 11  •  clindamycin (CLEOCIN) 300 MG capsule, Take 2 pills 1 hour before dental procedure, Disp: , Rfl:   •  Colcrys 0.6 MG tablet, 0.6 mg. Take 2 tablets, then 1 hour later take one tablet as needed for gout flares, Disp: , Rfl:   •  fluticasone (FLONASE) 50 MCG/ACT nasal spray, 1 spray into the nostril(s) as directed by provider Daily., Disp: , Rfl:   •  loratadine (CLARITIN) 10 MG tablet, Take 10 mg by mouth Daily., Disp: , Rfl:   •  metoprolol tartrate (LOPRESSOR) 25 MG tablet, Take 1 tablet by mouth 2 (Two) Times a Day., Disp: 180 tablet, Rfl: 3  •  tamsulosin (FLOMAX) 0.4 MG capsule 24 hr capsule, Take 1 capsule by mouth Daily., Disp: , Rfl:   •  telmisartan (MICARDIS) 40 MG tablet, TAKE 1 TABLET BY MOUTH EVERY DAY, Disp: 90 tablet, Rfl: 1  •  Zinc Sulfate 66 MG tablet, Daily., Disp: , Rfl:     Allergies   Allergen Reactions   • Citrullus Vulgaris Swelling and Anaphylaxis   • Other Anaphylaxis      "HALIBUT -patient states no issues with other fish/shellfish    • Penicillin G Other (See Comments)   • Penicillins    • Latex Rash     anaphylactic reaction to halibut        reports that he has never smoked. He has never used smokeless tobacco.      Objective:   Physical exam:  /84   Pulse 55   Ht 177.8 cm (70\")   Wt 105 kg (232 lb)   BMI 33.29 kg/m²   CONSTITUTIONAL: No acute distress  RESPIRATORY: Normal effort. Clear to auscultation bilaterally without wheezing or rales  CARDIOVASCULAR: Carotids with normal upstrokes without bruits.  Regular rate and rhythm with normal S1 and S2. Without murmur, gallop or rub. Normal radial pulse. There is right lower extremity edema.    Labs:    BUN   Date Value Ref Range Status   03/30/2021 19 8 - 23 mg/dL Final   01/29/2020 17 8 - 23 mg/dL Final     Creatinine   Date Value Ref Range Status   03/30/2021 1.01 0.76 - 1.27 mg/dL Final   01/29/2020 1.18 0.76 - 1.27 mg/dL Final     Potassium   Date Value Ref Range Status   03/30/2021 5.0 3.5 - 5.2 mmol/L Final   01/29/2020 3.8 3.5 - 5.2 mmol/L Final     ALT (SGPT)   Date Value Ref Range Status   03/30/2021 41 1 - 41 U/L Final   01/28/2020 24 1 - 41 U/L Final     AST (SGOT)   Date Value Ref Range Status   03/30/2021 28 1 - 40 U/L Final   01/28/2020 17 1 - 40 U/L Final       Lab Results   Component Value Date    CHOL 171 01/29/2020     Lab Results   Component Value Date    TRIG 87 03/30/2021     Lab Results   Component Value Date    HDL 42 03/30/2021     Lab Results   Component Value Date    LDL 80 03/30/2021     No components found for: LDLDIRECTC    Diagnostic Data:      ECG 12 Lead    Date/Time: 2/28/2022 3:59 PM  Performed by: Marilin Jansen APRN  Authorized by: Marilin Jansen APRN   Comparison: compared with previous ECG from 8/3/2021  Similar to previous ECG  Rhythm: sinus bradycardia  Rate: bradycardic  BPM: 55  Comments: QRS 94 ms,  ms            Results for orders placed during the hospital encounter of " 01/28/20    Adult Transthoracic Echo Complete W/ Cont if Necessary Per Protocol    Interpretation Summary  · Left ventricular systolic function is normal. Calculated EF = 55.0%. Estimated EF = 55%. Normal left ventricular cavity size and wall thickness noted. All left ventricular wall segments contract normally. Left ventricular diastolic dysfunction is noted (grade I) consistent with impaired relaxation.  · There is moderate calcification of the aortic valve.No aortic valve regurgitation is present.  · Mild-to-moderate mitral valve regurgitation is present.  · Physiologic tricuspid valve regurgitation is present. Insufficient TR velocity profile to estimate the right ventricular systolic pressure.      Assessment and Plan:   Diagnoses and all orders for this visit:    Supraventricular tachycardia (HCC) (Primary)  -Maintaining sinus rhythm.  -Continue metoprolol    Essential (primary) hypertension  -At goal, continue telmisartan and metoprolol    Right lower extremity edema  -Likely dependent in nature.  -Encouraged elevation when possible and use of compression stockings.    - Return in about 1 year (around 2/28/2023) for Next scheduled follow up with Dr. Marmolejo.    Electronically signed by MEGHAN Stout, 02/28/22, 3:56 PM EST.

## 2022-07-03 DIAGNOSIS — I10 ESSENTIAL HYPERTENSION: ICD-10-CM

## 2022-07-05 RX ORDER — TELMISARTAN 40 MG/1
TABLET ORAL
Qty: 90 TABLET | Refills: 2 | Status: SHIPPED | OUTPATIENT
Start: 2022-07-05 | End: 2023-03-31

## 2022-07-05 NOTE — TELEPHONE ENCOUNTER
Next appointment with Dr Marmolejo: 3/6/2023  Last appointment with Marilin: 2/28/2022  Last labs: 8/3/2021 (CMP scanned in from ASHLEE Santoyo+ WNMARY ANN and Brice VELASQUEZ)

## 2023-03-15 DIAGNOSIS — I10 ESSENTIAL (PRIMARY) HYPERTENSION: ICD-10-CM

## 2023-03-15 DIAGNOSIS — I47.1 SUPRAVENTRICULAR TACHYCARDIA: ICD-10-CM

## 2023-03-31 DIAGNOSIS — I10 ESSENTIAL HYPERTENSION: ICD-10-CM

## 2023-03-31 RX ORDER — TELMISARTAN 40 MG/1
TABLET ORAL
Qty: 90 TABLET | Refills: 2 | Status: SHIPPED | OUTPATIENT
Start: 2023-03-31

## 2023-06-19 ENCOUNTER — OFFICE VISIT (OUTPATIENT)
Dept: CARDIOLOGY | Facility: CLINIC | Age: 73
End: 2023-06-19
Payer: MEDICARE

## 2023-06-19 VITALS
HEIGHT: 70 IN | BODY MASS INDEX: 34.07 KG/M2 | WEIGHT: 238 LBS | HEART RATE: 59 BPM | SYSTOLIC BLOOD PRESSURE: 128 MMHG | DIASTOLIC BLOOD PRESSURE: 78 MMHG

## 2023-06-19 DIAGNOSIS — I47.1 SUPRAVENTRICULAR TACHYCARDIA: ICD-10-CM

## 2023-06-19 DIAGNOSIS — I10 ESSENTIAL HYPERTENSION: Primary | ICD-10-CM

## 2023-06-19 DIAGNOSIS — N28.9 RENAL INSUFFICIENCY: ICD-10-CM

## 2023-06-19 DIAGNOSIS — E78.2 MIXED HYPERLIPIDEMIA: ICD-10-CM

## 2023-06-19 RX ORDER — ALLOPURINOL 300 MG/1
300 TABLET ORAL DAILY
COMMUNITY
Start: 2023-04-26

## 2023-06-19 NOTE — PROGRESS NOTES
Date of Office Visit: 2023  Encounter Provider: MEGHAN Singh  Place of Service: [facility] CARDIOLOGY  Patient Name: Daniel Pineda  :1950    Chief complaint  Follow up paroxysmal SVT, hypertension    History of Present Illness  Patient is a 72 year old male patient of Dr. Marmolejo. Past medical history includes  supraventricular tachycardia, hypertension, dyslipidemia, renal insufficiency, sleep apnea, history of lung resection in childhood.     Patient was admitted on 2020 after episode of diaphoresis, shortness of breath, and dizziness that occurred at work while walking.  EMS noted tachycardia that was felt to be SVT and he received adenosine, Lopressor, and fluids with improvement in his symptoms.  In the ER predominant rhythm was sinus rhythm with sinus tachycardia.  EKG showed nonspecific ST-T wave changes.  He had echocardiogram done 2020 showing normal left ventricular systolic function with EF of 55%, grade 1 diastolic dysfunction, normal left ventricular wall motion, moderate calcification of the aortic valve without regurgitation, mild to moderate mitral regurgitation.  Nuclear stress test 2020 showed normal myocardial perfusion imaging with no evidence of ischemia considered a low risk study and with normal heart rate and blood pressure response to stress.      Interval history  Patient presents today for routine follow up. I will visit with him for the first time today and have reviewed his medical record.  Since last visit he has overall been doing well.  Unfortunately, he is not using his CPAP consistently.  He denies palpitations, dizziness, chest pain or chest pressure, syncope or presyncope.  He does report stable fatigue.  He feels that shortness of breath has been worse recently due to air quality and allergy symptoms.  He had a right knee replacement 2 years ago and has been having more edema in that leg in the last 6 months.  He is  walking daily at work between 10,000 and 12,000 steps per day and denies anginal symptoms.  No recent labs are available for review, though he states labs have been done with rheumatology and PCP this year.    Past Medical History:   Diagnosis Date    Allergic rhinitis     Arthritis of multiple sites     History of colonoscopy     about 5 yrs per pt    Positional vertigo     Screening for prostate cancer     been a while per pt     Past Surgical History:   Procedure Laterality Date    JOINT REPLACEMENT Left     LUNG SURGERY      left lower lobe removed at age 6     REPLACEMENT TOTAL KNEE Left 08/2018    TONSILLECTOMY       Outpatient Medications Prior to Visit   Medication Sig Dispense Refill    allopurinol (ZYLOPRIM) 300 MG tablet Take 1 tablet by mouth Daily.      ascorbic acid (VITAMIN C) 100 MG tablet Take  by mouth Daily.      budesonide-formoterol (SYMBICORT) 160-4.5 MCG/ACT inhaler Daily.      clindamycin (CLEOCIN) 300 MG capsule Take 2 pills 1 hour before dental procedure      Colcrys 0.6 MG tablet 1 tablet. Take 2 tablets, then 1 hour later take one tablet as needed for gout flares      fluticasone (FLONASE) 50 MCG/ACT nasal spray 1 spray into the nostril(s) as directed by provider Daily.      loratadine (CLARITIN) 10 MG tablet Take 1 tablet by mouth Daily.      metoprolol tartrate (LOPRESSOR) 25 MG tablet Take 1 tablet by mouth 2 (Two) Times a Day. 180 tablet 3    tamsulosin (FLOMAX) 0.4 MG capsule 24 hr capsule Take 1 capsule by mouth Daily.      telmisartan (MICARDIS) 40 MG tablet TAKE 1 TABLET BY MOUTH EVERY DAY 90 tablet 2    Zinc Sulfate 66 MG tablet Daily.       No facility-administered medications prior to visit.       Allergies as of 06/19/2023 - Reviewed 06/19/2023   Allergen Reaction Noted    Citrullus vulgaris Swelling and Anaphylaxis 01/29/2020    Other Anaphylaxis 01/29/2020    Penicillin g Other (See Comments) 08/09/2021    Penicillins  12/07/2015    Latex Rash 08/09/2021     Social History  "    Socioeconomic History    Marital status:      Spouse name: Gideon    Number of children: 1   Tobacco Use    Smoking status: Never    Smokeless tobacco: Never   Vaping Use    Vaping Use: Never used   Substance and Sexual Activity    Alcohol use: Yes     Alcohol/week: 10.0 standard drinks     Types: 10 Cans of beer per week    Drug use: No    Sexual activity: Not Currently     Family History   Problem Relation Age of Onset    Dementia Mother     Heart attack Brother 60    Diabetes type II Brother     Diabetes type I Brother      Review of Systems   Constitutional: Positive for malaise/fatigue.   Cardiovascular:  Positive for leg swelling. Negative for chest pain, claudication, dyspnea on exertion, near-syncope, orthopnea, palpitations, paroxysmal nocturnal dyspnea and syncope.   Respiratory:  Positive for shortness of breath.    Neurological:  Negative for brief paralysis, dizziness, headaches and light-headedness.   All other systems reviewed and are negative.     Objective:     Vitals:    06/19/23 1250   BP: 128/78   Pulse: 59   Weight: 108 kg (238 lb)   Height: 177.8 cm (70\")     Body mass index is 34.15 kg/m².    Vitals reviewed.   Constitutional:       General: Not in acute distress.     Appearance: Well-developed and not in distress. Not diaphoretic.   HENT:      Head: Normocephalic.   Pulmonary:      Effort: Pulmonary effort is normal. No respiratory distress.      Breath sounds: Normal breath sounds. No wheezing. No rhonchi. No rales.   Cardiovascular:      Normal rate. Regular rhythm.      Murmurs: There is no murmur.   Pulses:     Radial: 2+ bilaterally.  Edema:     Peripheral edema present.     Ankle: trace edema of the right ankle.     Feet: trace edema of the right foot.  Skin:     General: Skin is warm and dry. There is no cyanosis.      Findings: No rash.   Neurological:      Mental Status: Alert and oriented to person, place, and time.   Psychiatric:         Behavior: Behavior normal.         " Thought Content: Thought content normal.         Judgment: Judgment normal.     Lab Review:     ECG 12 Lead    Date/Time: 6/19/2023 1:06 PM  Performed by: Jeni Dyer APRN  Authorized by: Jeni Dyer APRN   Comparison: compared with previous ECG   Similar to previous ECG  Rhythm: sinus rhythm  Rate: normal  BPM: 59  QRS axis: normal  Comments: Similar to prior.  No new ischemic changes      Assessment:       Diagnosis Plan   1. Essential hypertension        2. Supraventricular tachycardia        3. Mixed hyperlipidemia        4. Idiopathic chronic gout of multiple sites without tophus          Plan:       Hypertension: Blood pressure controlled in office today. No dizziness or lightheadedness.  He will continue to check BP at home and call for BP consistently >130/80 or <110/50.  Paroxysmal supraventricular tachycardia: Maintaining sinus rhythm in the office today.  Denies palpitations or racing heartbeat sensation.  Renal insufficiency: Follows with primary care provider. No recent labs available for review. Will request copy of labs from PCP and rheumatology for our records.   Hyperlipidemia: PCP managing  Lower extremity edema. Mild and dependent. Would recommend compression stockings, low salt diet, and elevation to address edema. If edema worsens, could consider cautious trial of diuretic.    He will follow up with Dr. Marmolejo in 1 year or sooner for any new or worsening symptoms.      Time Spent: I spent 30 minutes caring for Daniel on this date of service. This time includes time spent by me in the following activities: preparing for the visit, reviewing tests, performing a medically appropriate examination and/or evaluation, counseling and educating the patient/family/caregiver, ordering medications, tests, or procedures, and documenting information in the medical record.   I spent 1 minutes on the separately reported service of ECG. This time is not included in the time used to support the E/M  service also reported today.        Your medication list            Accurate as of June 19, 2023  1:04 PM. If you have any questions, ask your nurse or doctor.                CONTINUE taking these medications        Instructions Last Dose Given Next Dose Due   allopurinol 300 MG tablet  Commonly known as: ZYLOPRIM      Take 1 tablet by mouth Daily.       ascorbic acid 100 MG tablet  Commonly known as: VITAMIN C      Take  by mouth Daily.       budesonide-formoterol 160-4.5 MCG/ACT inhaler  Commonly known as: SYMBICORT      Daily.       clindamycin 300 MG capsule  Commonly known as: CLEOCIN      Take 2 pills 1 hour before dental procedure       Colcrys 0.6 MG tablet  Generic drug: colchicine      1 tablet. Take 2 tablets, then 1 hour later take one tablet as needed for gout flares       fluticasone 50 MCG/ACT nasal spray  Commonly known as: FLONASE      1 spray into the nostril(s) as directed by provider Daily.       loratadine 10 MG tablet  Commonly known as: CLARITIN      Take 1 tablet by mouth Daily.       metoprolol tartrate 25 MG tablet  Commonly known as: LOPRESSOR      Take 1 tablet by mouth 2 (Two) Times a Day.       tamsulosin 0.4 MG capsule 24 hr capsule  Commonly known as: FLOMAX      Take 1 capsule by mouth Daily.       telmisartan 40 MG tablet  Commonly known as: MICARDIS      TAKE 1 TABLET BY MOUTH EVERY DAY       Zinc Sulfate 66 MG tablet      Daily.                Patient is no longer taking -.  I corrected the med list to reflect this.  I did not stop these medications.      Dictated utilizing Dragon dictation

## 2023-12-21 ENCOUNTER — TELEPHONE (OUTPATIENT)
Dept: CARDIOLOGY | Facility: CLINIC | Age: 73
End: 2023-12-21

## 2023-12-28 NOTE — TELEPHONE ENCOUNTER
12/27 - LVM  12/28 - STP, HE DOES NOT WANT TO SCHEDULE AT THIS TIME, HE WANTS TO WAIT AND SEE WHAT COMES OF THE NEGOTIATIONS BETWEEN [employer] & [employer].

## 2023-12-31 DIAGNOSIS — I10 ESSENTIAL HYPERTENSION: ICD-10-CM

## 2024-01-02 RX ORDER — TELMISARTAN 40 MG/1
TABLET ORAL
Qty: 90 TABLET | Refills: 0 | Status: SHIPPED | OUTPATIENT
Start: 2024-01-02

## 2024-01-02 NOTE — TELEPHONE ENCOUNTER
Labs are scanned into EPIC.  He cancelled appointment secondary to [employer] issues.  1 times only refill?

## 2024-01-02 NOTE — TELEPHONE ENCOUNTER
OK for 1 time only refill. He will either need to see us or have someone take over script after that. Thanks!

## 2024-02-16 DIAGNOSIS — I47.10 SUPRAVENTRICULAR TACHYCARDIA: ICD-10-CM

## 2024-02-16 DIAGNOSIS — I10 ESSENTIAL (PRIMARY) HYPERTENSION: ICD-10-CM

## 2024-02-16 NOTE — TELEPHONE ENCOUNTER
Rx Refill Note  Requested Prescriptions     Pending Prescriptions Disp Refills    metoprolol tartrate (LOPRESSOR) 25 MG tablet [Pharmacy Med Name: Metoprolol Tartrate 25 MG Oral Tablet] 180 tablet 3     Sig: TAKE 1 TABLET BY MOUTH TWICE  DAILY      Last office visit with prescribing clinician: 6/19/23    Last telemedicine visit with prescribing clinician: Visit date not found   Next office visit with prescribing clinician:                         Would you like a call back once the refill request has been completed: [] Yes [] No    If the office needs to give you a call back, can they leave a voicemail: [] Yes [] No    Angie Sorenson, [facility]  02/16/24, 08:29 EST    Return in about 6 months (around 12/19/2023) for with Dr. Marmolejo.

## 2024-04-23 ENCOUNTER — TELEPHONE (OUTPATIENT)
Dept: FAMILY MEDICINE CLINIC | Facility: CLINIC | Age: 74
End: 2024-04-23
Payer: MEDICARE

## 2024-04-23 ENCOUNTER — TELEPHONE (OUTPATIENT)
Dept: FAMILY MEDICINE CLINIC | Facility: CLINIC | Age: 74
End: 2024-04-23

## 2024-04-23 ENCOUNTER — OFFICE VISIT (OUTPATIENT)
Dept: CARDIOLOGY | Facility: CLINIC | Age: 74
End: 2024-04-23
Payer: MEDICARE

## 2024-04-23 VITALS
BODY MASS INDEX: 33.21 KG/M2 | DIASTOLIC BLOOD PRESSURE: 68 MMHG | HEIGHT: 70 IN | WEIGHT: 232 LBS | SYSTOLIC BLOOD PRESSURE: 122 MMHG | HEART RATE: 63 BPM

## 2024-04-23 DIAGNOSIS — E78.2 MIXED HYPERLIPIDEMIA: Primary | ICD-10-CM

## 2024-04-23 DIAGNOSIS — I10 ESSENTIAL HYPERTENSION: ICD-10-CM

## 2024-04-23 DIAGNOSIS — I47.10 PAROXYSMAL SVT (SUPRAVENTRICULAR TACHYCARDIA): ICD-10-CM

## 2024-04-23 PROCEDURE — 1160F RVW MEDS BY RX/DR IN RCRD: CPT | Performed by: INTERNAL MEDICINE

## 2024-04-23 PROCEDURE — 3074F SYST BP LT 130 MM HG: CPT | Performed by: INTERNAL MEDICINE

## 2024-04-23 PROCEDURE — 3078F DIAST BP <80 MM HG: CPT | Performed by: INTERNAL MEDICINE

## 2024-04-23 PROCEDURE — 1159F MED LIST DOCD IN RCRD: CPT | Performed by: INTERNAL MEDICINE

## 2024-04-23 PROCEDURE — 99214 OFFICE O/P EST MOD 30 MIN: CPT | Performed by: INTERNAL MEDICINE

## 2024-04-23 PROCEDURE — 93000 ELECTROCARDIOGRAM COMPLETE: CPT | Performed by: INTERNAL MEDICINE

## 2024-04-23 RX ORDER — TELMISARTAN 40 MG/1
40 TABLET ORAL DAILY
Qty: 10 TABLET | Refills: 0 | Status: SHIPPED | OUTPATIENT
Start: 2024-04-23

## 2024-04-23 NOTE — TELEPHONE ENCOUNTER
Caller: Daniel Pineda    Relationship: Self    Best call back number: [phone]    Requested Prescriptions:   Requested Prescriptions     Pending Prescriptions Disp Refills    telmisartan (MICARDIS) 40 MG tablet 90 tablet 0     Sig: Take 1 tablet by mouth Daily.        Pharmacy where request should be sent: [facility] [address] [place] [address] [phone] [facility] [phone]      Last office visit with prescribing clinician: Visit date not found   Last telemedicine visit with prescribing clinician: Visit date not found   Next office visit with prescribing clinician: 6/6/2024     Additional details provided by patient: PATIENT HAS APPROX TWO WEEKS LEFT    Does the patient have less than a 3 day supply:  [x] Yes  [] No    Would you like a call back once the refill request has been completed: [] Yes [x] No    If the office needs to give you a call back, can they leave a voicemail: [] Yes [x] No    Nica Evans Rep   04/23/24 12:16 EDT

## 2024-04-23 NOTE — TELEPHONE ENCOUNTER
Caller: Daniel Pineda    Relationship to patient: Self    Best call back number: [phone]    Type of visit: LABS    Requested date: PRIOR TO JUNE 6    Additional notes:PATIENT HAS [facility] ON JUNE 6 REQUESTING LABS PRIOR

## 2024-04-23 NOTE — PROGRESS NOTES
Date of Office Visit: 2024  Encounter Provider: Fatemeh Marmolejo MD  Place of Service: [facility] CARDIOLOGY  Patient Name: Daniel Pineda  :1950    Chief complaint  Paroxysmal supraventricular tachycardia, hypertension    History of Present Illness  Patient is a 72year-old female with history of hypertension, hyperlipidemia, sleep apnea (untreated) renal insufficiency, distant history of lung resection.  She was admitted in 2020 with supraventricular tachycardia and converted to sinus rhythm.  ST-T wave changes were abnormal.  An echocardiogram showed normal systolic function with an ejection fraction of 55%, grade 1 diastolic dysfunction, normal wall motion, mild to moderate mitral regurgitation with aortic valve calcification without stenosis.  A stress perfusion study was negative for ischemia.  Seen in follow-up in 2020 he had no known recurrence    Since last visit he is not using CPAP.  He has had no palpitations edema dizziness chest pain.  Shortness of breath is unchanged.  He is walking extensively at work.  Blood pressure appears at home is surprisingly higher typically in the 140s over 80s.      Past Medical History:   Diagnosis Date    Allergic rhinitis     Arthritis of multiple sites     History of colonoscopy     about 5 yrs per pt    Positional vertigo     Screening for prostate cancer     been a while per pt     Past Surgical History:   Procedure Laterality Date    JOINT REPLACEMENT Left     LUNG SURGERY      left lower lobe removed at age 6     REPLACEMENT TOTAL KNEE Left 2018    TONSILLECTOMY       Outpatient Medications Prior to Visit   Medication Sig Dispense Refill    allopurinol (ZYLOPRIM) 300 MG tablet Take 1 tablet by mouth Daily.      budesonide-formoterol (SYMBICORT) 160-4.5 MCG/ACT inhaler Daily.      clindamycin (CLEOCIN) 300 MG capsule Take 2 pills 1 hour before dental procedure      Colcrys 0.6 MG tablet 1 tablet. Take 2 tablets,  "then 1 hour later take one tablet as needed for gout flares      fluticasone (FLONASE) 50 MCG/ACT nasal spray 1 spray into the nostril(s) as directed by provider Daily.      loratadine (CLARITIN) 10 MG tablet Take 1 tablet by mouth Daily.      tamsulosin (FLOMAX) 0.4 MG capsule 24 hr capsule Take 1 capsule by mouth Daily.      telmisartan (MICARDIS) 40 MG tablet TAKE 1 TABLET BY MOUTH EVERY DAY 90 tablet 0    ascorbic acid (VITAMIN C) 100 MG tablet Take  by mouth Daily. (Patient not taking: Reported on 4/23/2024)      metoprolol tartrate (LOPRESSOR) 25 MG tablet Take 1 tablet by mouth 2 (Two) Times a Day. (Patient not taking: Reported on 4/23/2024) 180 tablet 3     No facility-administered medications prior to visit.       Allergies as of 04/23/2024 - Reviewed 04/23/2024   Allergen Reaction Noted    Citrullus vulgaris Swelling and Anaphylaxis 01/29/2020    Other Anaphylaxis 01/29/2020    Penicillin g Other (See Comments) 08/09/2021    Penicillins  12/07/2015    Latex Rash 08/09/2021     Social History     Socioeconomic History    Marital status:      Spouse name: Gideon    Number of children: 1   Tobacco Use    Smoking status: Never    Smokeless tobacco: Never   Vaping Use    Vaping status: Never Used   Substance and Sexual Activity    Alcohol use: Yes     Alcohol/week: 10.0 standard drinks of alcohol     Types: 10 Cans of beer per week    Drug use: No    Sexual activity: Not Currently     Family History   Problem Relation Age of Onset    Dementia Mother     Heart attack Brother 60    Diabetes type II Brother     Diabetes type I Brother      Review of Systems   Respiratory:  Negative for cough, snoring and wheezing.         Objective:     Vitals:    04/23/24 1025   BP: 122/68   Pulse: 63   Weight: 105 kg (232 lb)   Height: 177.8 cm (70\")     Body mass index is 33.29 kg/m².    Vitals reviewed.   Constitutional:       Appearance: Well-developed. Obese.   Eyes:      General: No scleral icterus.        Right eye: " No discharge.      Conjunctiva/sclera: Conjunctivae normal.      Pupils: Pupils are equal, round, and reactive to light.   HENT:      Head: Normocephalic.      Nose: Nose normal.   Neck:      Thyroid: No thyromegaly.      Vascular: No JVD.   Pulmonary:      Effort: Pulmonary effort is normal. No respiratory distress.      Breath sounds: Normal breath sounds. No wheezing. No rales.   Cardiovascular:      Normal rate. Regular rhythm. Normal S1. Normal S2.       Murmurs: There is no murmur.      No gallop.    Pulses:     Intact distal pulses.      Carotid: 2+ bilaterally.     Radial: 2+ bilaterally.     Femoral: 2+ bilaterally.     Popliteal: 2+ bilaterally.     Dorsalis pedis: 2+ bilaterally.     Posterior tibial: 2+ bilaterally.  Edema:     Peripheral edema absent.   Abdominal:      General: Bowel sounds are normal. There is no distension.      Palpations: Abdomen is soft.      Tenderness: There is no abdominal tenderness. There is no rebound.   Musculoskeletal: Normal range of motion.         General: No tenderness.      Cervical back: Normal range of motion and neck supple. Skin:     General: Skin is warm and dry.      Findings: No erythema or rash.   Neurological:      Mental Status: Alert and oriented to person, place, and time.   Psychiatric:         Behavior: Behavior normal.         Thought Content: Thought content normal.         Judgment: Judgment normal.       Lab Review:         ECG 12 Lead    Date/Time: 4/23/2024 10:58 AM  Performed by: Fatemeh Marmolejo MD    Authorized by: Fatemeh Marmolejo MD  Comparison: compared with previous ECG   Similar to previous ECG  Rhythm: sinus rhythm  Other findings: non-specific ST-T wave changes    Clinical impression: abnormal EKG            Diagnosis Plan   1. Mixed hyperlipidemia        2. Paroxysmal SVT (supraventricular tachycardia)        3. Essential hypertension          Plan:       1.  Hypertension.  He will change the batteries on his blood pressure device and start  checking this more consistently at home.  He will also get routine blood work which he has not done recently.  2.  Paroxysmal supraventricular tachycardia.  Asymptomatic  3.  Mild to moderate mitral valve regurgitation.  It has been several years since his last evaluation.  Will check an echocardiogram   4.  Hyperlipidemia  5.  Renal insufficiency.  Again recommended he have routine blood work and he will contact Dr. Rascon to do so.    6.  History of gout.  He is seen by rheumatology for this.        Time Spent: I spent 35 minutes caring for Daniel on this date of service. This time includes time spent by me in the following activities: preparing for the visit, reviewing tests, obtaining and/or reviewing a separately obtained history, performing a medically appropriate examination and/or evaluation, counseling and educating the patient/family/caregiver, ordering medications, tests, or procedures, documenting information in the medical record, and independently interpreting results and communicating that information with the patient/family/caregiver.   I spent 1 minutes on the separately reported service of ECG. This time is not included in the time used to support the E/M service also reported today.        Your medication list            Accurate as of April 23, 2024 11:59 PM. If you have any questions, ask your nurse or doctor.                CONTINUE taking these medications        Instructions Last Dose Given Next Dose Due   allopurinol 300 MG tablet  Commonly known as: ZYLOPRIM      Take 1 tablet by mouth Daily.       budesonide-formoterol 160-4.5 MCG/ACT inhaler  Commonly known as: SYMBICORT      Daily.       clindamycin 300 MG capsule  Commonly known as: CLEOCIN      Take 2 pills 1 hour before dental procedure       Colcrys 0.6 MG tablet  Generic drug: colchicine      1 tablet. Take 2 tablets, then 1 hour later take one tablet as needed for gout flares       fluticasone 50 MCG/ACT nasal spray  Commonly known  as: FLONASE      1 spray into the nostril(s) as directed by provider Daily.       loratadine 10 MG tablet  Commonly known as: CLARITIN      Take 1 tablet by mouth Daily.       tamsulosin 0.4 MG capsule 24 hr capsule  Commonly known as: FLOMAX      Take 1 capsule by mouth Daily.       telmisartan 40 MG tablet  Commonly known as: MICARDIS      Take 1 tablet by mouth Daily.                 Where to Get Your Medications        These medications were sent to [facility] - [place], [place] - [address] [phone]  - [phone]   [address] [address]      Phone: [phone]   telmisartan 40 MG tablet         Patient is no longer taking -.  I corrected the med list to reflect this.  I did not stop these medications.      Dictated utilizing Dragon dictation

## 2024-04-29 ENCOUNTER — TELEPHONE (OUTPATIENT)
Dept: FAMILY MEDICINE CLINIC | Facility: CLINIC | Age: 74
End: 2024-04-29
Payer: MEDICARE

## 2024-04-29 NOTE — TELEPHONE ENCOUNTER
Called PT to schedule lab visit. Scheduled appointment for 5/20 at 10:30 AM, Pt confirmed that this date/time does work for him.

## 2024-06-06 ENCOUNTER — OFFICE VISIT (OUTPATIENT)
Dept: FAMILY MEDICINE CLINIC | Facility: CLINIC | Age: 74
End: 2024-06-06
Payer: MEDICARE

## 2024-06-06 VITALS
HEIGHT: 70 IN | OXYGEN SATURATION: 98 % | SYSTOLIC BLOOD PRESSURE: 138 MMHG | HEART RATE: 64 BPM | WEIGHT: 232 LBS | BODY MASS INDEX: 33.21 KG/M2 | DIASTOLIC BLOOD PRESSURE: 76 MMHG

## 2024-06-06 DIAGNOSIS — M1A.09X0 IDIOPATHIC CHRONIC GOUT OF MULTIPLE SITES WITHOUT TOPHUS: ICD-10-CM

## 2024-06-06 DIAGNOSIS — E78.2 MIXED HYPERLIPIDEMIA: ICD-10-CM

## 2024-06-06 DIAGNOSIS — R35.1 NOCTURIA: ICD-10-CM

## 2024-06-06 DIAGNOSIS — Z00.00 MEDICARE ANNUAL WELLNESS VISIT, SUBSEQUENT: Primary | ICD-10-CM

## 2024-06-06 DIAGNOSIS — Z12.11 ENCOUNTER FOR SCREENING FOR MALIGNANT NEOPLASM OF COLON: ICD-10-CM

## 2024-06-06 DIAGNOSIS — E55.9 VITAMIN D DEFICIENCY: ICD-10-CM

## 2024-06-06 DIAGNOSIS — I10 ESSENTIAL HYPERTENSION: ICD-10-CM

## 2024-06-06 DIAGNOSIS — R73.01 IMPAIRED FASTING GLUCOSE: ICD-10-CM

## 2024-06-06 PROBLEM — M25.561 PAIN IN RIGHT KNEE: Status: RESOLVED | Noted: 2018-11-16 | Resolved: 2024-06-06

## 2024-06-06 PROBLEM — M17.11 PRIMARY OSTEOARTHRITIS OF RIGHT KNEE: Status: RESOLVED | Noted: 2021-01-07 | Resolved: 2024-06-06

## 2024-06-06 PROBLEM — N45.1 EPIDIDYMITIS, RIGHT: Status: RESOLVED | Noted: 2021-10-04 | Resolved: 2024-06-06

## 2024-06-06 PROBLEM — M17.0 PRIMARY OSTEOARTHRITIS OF BOTH KNEES: Status: RESOLVED | Noted: 2021-08-09 | Resolved: 2024-06-06

## 2024-06-06 RX ORDER — TELMISARTAN 40 MG/1
40 TABLET ORAL DAILY
Qty: 90 TABLET | Refills: 4 | Status: SHIPPED | OUTPATIENT
Start: 2024-06-06 | End: 2025-08-30

## 2024-06-06 NOTE — PATIENT INSTRUCTIONS
Advance Care Planning and Advance Directives     You make decisions on a daily basis - decisions about where you want to live, your career, your home, your life. Perhaps one of the most important decisions you face is your choice for future medical care. Take time to talk with your family and your healthcare team and start planning today.  Advance Care Planning is a process that can help you:  Understand possible future healthcare decisions in light of your own experiences  Reflect on those decision in light of your goals and values  Discuss your decisions with those closest to you and the healthcare professionals that care for you  Make a plan by creating a document that reflects your wishes    Surrogate Decision Maker  In the event of a medical emergency, which has left you unable to communicate or to make your own decisions, you would need someone to make decisions for you.  It is important to discuss your preferences for medical treatment with this person while you are in good health.     Qualities of a surrogate decision maker:  Willing to take on this role and responsibility  Knows what you want for future medical care  Willing to follow your wishes even if they don't agree with them  Able to make difficult medical decisions under stressful circumstances    Advance Directives  These are legal documents you can create that will guide your healthcare team and decision maker(s) when needed. These documents can be stored in the electronic medical record.    Living Will - a legal document to guide your care if you have a terminal condition or a serious illness and are unable to communicate. States vary by statute in document names/types, but most forms may include one or more of the following:        -  Directions regarding life-prolonging treatments        -  Directions regarding artificially provided nutrition/hydration        -  Choosing a healthcare decision maker        -  Direction regarding organ/tissue  donation    Durable Power of  for Healthcare - this document names an -in-fact to make medical decisions for you, but it may also allow this person to make personal and financial decisions for you. Please seek the advice of an  if you need this type of document.    **Advance Directives are not required and no one may discriminate against you if you do not sign one.    Medical Orders  Many states allow specific forms/orders signed by your physician to record your wishes for medical treatment in your current state of health. This form, signed in personal communication with your physician, addresses resuscitation and other medical interventions that you may or may not want.      For more information or to schedule a time with a [facility] Advance Care Planning Facilitator contact: [facility] or call [phone] and someone will contact you directly.    Prevnar 20 Vaccine is recommended to help prevent bacterial pneumonia. (one time only after age 65). You are due for Shingrix vaccination series to prevent Shingles. Please get the immunization at your local pharmacy. It is more effective than the old Zostavax vaccine and is recommended even if you have had the Zostavax vaccine in the past. For more information, please look at the website below:  https://www.cdc.gov/vaccines/vpd/shingles/public/shingrix/index.html    Shingrix Vaccine Injection  What is this medication?  ZOSTER VACCINE (ZOS ter vak SEEN) reduces the risk of herpes zoster (shingles). It does not treat shingles. It is still possible to get shingles after receiving the vaccine, but the symptoms may be less severe or not last as long. It works by helping your immune system learn how to fight off a future infection.  This medicine may be used for other purposes; ask your health care provider or pharmacist if you have questions.  COMMON BRAND NAME(S): SHINGRIX  What should I tell my care team before I take this  medication?  They need to know if you have any of these conditions:  Cancer  Immune system problems  An unusual or allergic reaction to Zoster vaccine, other medications, foods, dyes, or preservatives  Pregnant or trying to get pregnant  Breastfeeding  How should I use this medication?  This vaccine is injected into a muscle. It is given by your care team.  This vaccine requires 2 doses to get the full benefit. Set a reminder for when your next dose is due.  A copy of Vaccine Information Statements will be given before each vaccination. Be sure to read this information carefully each time. This sheet may change often.  Talk to your care team about the use of this vaccine in children. This vaccine is not approved for use in children.  Overdosage: If you think you have taken too much of this medicine contact a poison control center or emergency room at once.  NOTE: This medicine is only for you. Do not share this medicine with others.  What if I miss a dose?  Keep appointments for follow-up (booster) doses. It is important not to miss your dose. Call your care team if you are unable to keep an appointment.  What may interact with this medication?  Medications that suppress your immune system  Medications to treat cancer  Steroid medications, such as prednisone or cortisone  This list may not describe all possible interactions. Give your health care provider a list of all the medicines, herbs, non-prescription drugs, or dietary supplements you use. Also tell them if you smoke, drink alcohol, or use illegal drugs. Some items may interact with your medicine.  What should I watch for while using this medication?  Visit your care team regularly.  This vaccine, like all vaccines, may not fully protect everyone.  What side effects may I notice from receiving this medication?  Side effects that you should report to your care team as soon as possible:  Allergic reactions--skin rash, itching, hives, swelling of the face, lips,  tongue, or throat  Side effects that usually do not require medical attention (report these to your care team if they continue or are bothersome):  Chills  Fatigue  Feeling faint or lightheaded  Fever  Headache  Muscle pain  Pain, redness, or irritation at injection site  This list may not describe all possible side effects. Call your doctor for medical advice about side effects. You may report side effects to FDA at 9-277-NDO-4946.  Where should I keep my medication?  This vaccine is only given by your care team. It will not be stored at home.  NOTE: This sheet is a summary. It may not cover all possible information. If you have questions about this medicine, talk to your doctor, pharmacist, or health care provider.  © 2023 [employer]vier/Gold Standard (2023-06-08 00:00:00)     RSV vaccine is recommended to prevent Respiratory syncytial virus infection. It is recommended for adults over age 60. Please get this vaccination at your local pharmacy.  I have included some information about this infection for your consideration.     Respiratory Syncytial Virus Infection, Adult  Respiratory syncytial virus (RSV) infection is an infection caused by RSV, a common virus. This virus is similar to viruses that cause the common cold and the flu. RSV infection can affect the nose, throat, windpipe, and lungs (respiratory system). When the infection is severe, it can cause:  Bronchiolitis. This condition causes inflammation of the air passages in the lungs (bronchioles).  Pneumonia. This condition causes inflammation of the air sacs in the lungs.  RSV infection spreads from person to person (is contagious) through droplets from coughs and sneezes (respiratory secretions). This condition is rarely serious when it occurs in adults.  What are the causes?  This condition is caused by contact with RSV. This can happen by:  Breathing respiratory secretions from someone who has the infection.  Touching something that has been exposed to the  virus (is contaminated) and then touching your mouth, nose, or eyes.  Coming in close contact with someone who has this infection. This may happen if you:  Hug or kiss.  Shake or hold hands.  Eat or drink using the same dishes or utensils.  What increases the risk?  The following factors may make you more likely to develop this condition:  Being 65 years of age or older.  Having certain health conditions, including:  A long-term (chronic) lung condition, such as chronic obstructive pulmonary disease (COPD).  An immune system that is weak. This is your body's defense system.  Down syndrome.  Heart disease.  Working in a hospital or other health care facility.  Living in a long-term health care facility.  RSV infections are most common from the months of November to April, but they can happen any time of year.  What are the signs or symptoms?  Symptoms of this condition include:  Having a runny nose.  Coughing. You may have a cough that brings up mucus (productive cough).  Sneezing.  Having a fever.  Wanting to eat less than usual.  Breathing loudly (wheezing).  Having shortness of breath.  Having fluid build up in the lungs (respiratory distress).  How is this diagnosed?  This condition may be diagnosed based on:  Your symptoms.  Your medical history.  A physical exam.  A chest X-ray to rule out pneumonia.  Blood tests or tests of mucus from your lungs (sputum). These tests may be done for older adults.  A test of a sample of your respiratory secretions.  How is this treated?  In most cases, the RSV infection will go away after 1-2 weeks of caring for yourself at home.   Sometimes, RSV infection is severe and can cause bronchiolitis or pneumonia. If you develop one or both of these conditions, you may need to be treated in the hospital. You may be given:  Oxygen therapy.  Antiviral medicine.  Medicines to open your bronchioles (bronchodilators).  Follow these instructions at home:  Medicines  Take over-the-counter  and prescription medicines only as told by your health care provider.  If you were prescribed an antiviral medicine, take it as told by your health care provider. Do not stop using the antiviral even if you start to feel better.  Lifestyle    Eat a healthy diet.  Do not drink alcohol.  Do not use any products that contain nicotine or tobacco, such as cigarettes, e-cigarettes, and chewing tobacco. If you need help quitting, ask your health care provider.  Rest at home until your symptoms go away.  Return to your normal activities as told by your health care provider. Ask your health care provider what activities are safe for you.  General instructions    Drink enough fluid to keep your urine pale yellow.  Gargle with a salt-water mixture 3-4 times a day or as needed. To make a salt-water mixture, completely dissolve ½-1 tsp (3-6 g) of salt in 1 cup (237 mL) of warm water.  Keep all follow-up visits as told by your health care provider. This is important.  How is this prevented?  To prevent catching and spreading RSV:  Wash your hands often with soap and water for at least 20 seconds. If soap and water are not available, use hand . Do not touch your face without first cleaning your hands.  Stay home if you have symptoms of the common cold or the flu.  Cover your nose and mouth when you cough or sneeze.  Avoid large groups of people.  Keep a safe distance of about 6 feet (1.8 m) from people who are coughing or sneezing.  Where to find more information  Centers for Disease Control and Prevention: www.cdc.gov  Contact a health care provider if:  Your symptoms get worse or have not changed after 2 weeks.  You have:  A fever.  Hot flashes, sweating, or chills that keep happening.  A cough that brings up much more mucus than usual.  A cough that brings up blood.  You feel:  Very tired (lethargic).  Confused.  Get help right away if:  You have increased or severe trouble breathing.  You lose consciousness.  These  symptoms may represent a serious problem that is an emergency. Do not wait to see if the symptoms will go away. Get medical help right away. Call your local emergency services (911 in the [place] Do not drive yourself to the hospital.  Summary  Respiratory syncytial virus (RSV) infection is an infection caused by RSV, a common virus. RSV infection can affect the nose, throat, windpipe, and lungs (respiratory system).  When the infection is severe, it can cause bronchiolitis or pneumonia.  Take over-the-counter and prescription medicines only as told by your health care provider.  Contact a health care provider if your symptoms get worse or have not changed after 2 weeks.  This information is not intended to replace advice given to you by your health care provider. Make sure you discuss any questions you have with your health care provider.  Document Revised: 09/30/2020 Document Reviewed: 10/07/2020  [employer] Patient Education © 2022 [employer]     You are due for Covid booster. Please get this vaccine at your local pharmacy. COVID-19 vaccine may be administered without regards to timing of other vaccines.  If administering on the same day as another vaccine, administer in a different site (arm).  COVID-19 VACCINE reduces the risk of COVID-19. It does not treat COVID-19. It is still possible to get COVID-19 after receiving this vaccine, but the symptoms may be less severe or not last as long. It works by helping your immune system learn how to fight off a future infection.     What side effects may I notice from receiving this medication?  Side effects that you should report to your care team as soon as possible:  Allergic reactions--skin rash, itching, hives, swelling of the face, lips, tongue, or throat  Heart muscle inflammation--unusual weakness or fatigue, shortness of breath, chest pain, fast or irregular heartbeat, dizziness, swelling of the ankles, feet, or hands  Side effects that usually do not require  medical attention (report these to your care team if they continue or are bothersome):  Chills  Fatigue  Fever  Headache  Joint pain  Muscle pain  Nausea  Pain, redness, or irritation at injection site  Swollen lymph nodes  Vomiting  This list may not describe all possible side effects. Call your doctor for medical advice about side effects. You may report side effects to FDA at 5-728-FDA-3293.     Annual flu shot has been recommended to patient. If you are age 65 or greater, then get the high dose influenza vaccination. Optimal timing of this vaccination is in mid October of each year.    Influenza (Flu) Vaccine (Inactivated or Recombinant): What You Need to Know  1. Why get vaccinated?  Influenza vaccine can prevent influenza (flu).  Flu is a contagious disease that spreads around the [place] every year, usually between October and May. Anyone can get the flu, but it is more dangerous for some people. Infants and young children, people 65 years and older, pregnant people, and people with certain health conditions or a weakened immune system are at greatest risk of flu complications.  Pneumonia, bronchitis, sinus infections, and ear infections are examples of flu-related complications. If you have a medical condition, such as heart disease, cancer, or diabetes, flu can make it worse.  Flu can cause fever and chills, sore throat, muscle aches, fatigue, cough, headache, and runny or stuffy nose. Some people may have vomiting and diarrhea, though this is more common in children than adults.  In an average year, thousands of people in the [place] die from flu, and many more are hospitalized. Flu vaccine prevents millions of illnesses and flu-related visits to the doctor each year.  2. Influenza vaccines  CDC recommends everyone 6 months and older get vaccinated every flu season. Children 6 months through 8 years of age may need 2 doses during a single flu season. Everyone else needs only 1 dose each flu  "season.  It takes about 2 weeks for protection to develop after vaccination.  There are many flu viruses, and they are always changing. Each year a new flu vaccine is made to protect against the influenza viruses believed to be likely to cause disease in the upcoming flu season. Even when the vaccine doesn't exactly match these viruses, it may still provide some protection.  Influenza vaccine does not cause flu.  Influenza vaccine may be given at the same time as other vaccines.  3. Talk with your health care provider  Tell your vaccination provider if the person getting the vaccine:  Has had an allergic reaction after a previous dose of influenza vaccine, or has any severe, life-threatening allergies  Has ever had Guillain-Barré Syndrome (also called \"GBS\")  In some cases, your health care provider may decide to postpone influenza vaccination until a future visit.  Influenza vaccine can be administered at any time during pregnancy. People who are or will be pregnant during influenza season should receive inactivated influenza vaccine.  People with minor illnesses, such as a cold, may be vaccinated. People who are moderately or severely ill should usually wait until they recover before getting influenza vaccine.  Your health care provider can give you more information.  4. Risks of a vaccine reaction  Soreness, redness, and swelling where the shot is given, fever, muscle aches, and headache can happen after influenza vaccination.  There may be a very small increased risk of Guillain-Barré Syndrome (GBS) after inactivated influenza vaccine (the flu shot).  Young children who get the flu shot along with pneumococcal vaccine (PCV13) and/or DTaP vaccine at the same time might be slightly more likely to have a seizure caused by fever. Tell your health care provider if a child who is getting flu vaccine has ever had a seizure.  People sometimes faint after medical procedures, including vaccination. Tell your provider if " you feel dizzy or have vision changes or ringing in the ears.  As with any medicine, there is a very remote chance of a vaccine causing a severe allergic reaction, other serious injury, or death.  5. What if there is a serious problem?  An allergic reaction could occur after the vaccinated person leaves the clinic. If you see signs of a severe allergic reaction (hives, swelling of the face and throat, difficulty breathing, a fast heartbeat, dizziness, or weakness), call 9-1-1 and get the person to the nearest hospital.  For other signs that concern you, call your health care provider.  Adverse reactions should be reported to the Vaccine Adverse Event Reporting System (VAERS). Your health care provider will usually file this report, or you can do it yourself. Visit the VAERS website at www.vaers.hhs.gov or call 1-770.204.5264. VAERS is only for reporting reactions, and VAERS staff members do not give medical advice.  6. The National Vaccine Injury Compensation Program  The National Vaccine Injury Compensation Program (VICP) is a federal program that was created to compensate people who may have been injured by certain vaccines. Claims regarding alleged injury or death due to vaccination have a time limit for filing, which may be as short as two years. Visit the VICP website at www.hrsa.gov/vaccinecompensation or call 1-127.768.6096 to learn about the program and about filing a claim.  7. How can I learn more?  Ask your health care provider.  Call your local or state health department.  Visit the website of the Food and Drug Administration (FDA) for vaccine package inserts and additional information at www.fda.gov/vaccines-blood-biologics/vaccines.  Contact the Centers for Disease Control and Prevention (CDC):  Call 1-680.151.4736 (1-378-QLG-INFO) or  Visit CDC's website at www.cdc.gov/flu.  Source: CDC Vaccine Information Statement Inactivated Influenza Vaccine (8/6/2021)  This same material is available at  www.cdc.gov for no charge.  This information is not intended to replace advice given to you by your health care provider. Make sure you discuss any questions you have with your health care provider.  Document Revised: 11/15/2022 Document Reviewed: 2022  [employer] Patient Education ©  [employer] Inc.         Medicare Wellness  Personal Prevention Plan of Service     Date of Office Visit:    Encounter Provider:  Basilio Rascon MD  Place of Service:  [facility]  Patient Name: Daniel Pineda  :  1950    As part of the Medicare Wellness portion of your visit today, we are providing you with this personalized preventive plan of services (PPPS). This plan is based upon recommendations of the [place] Preventive Services Task Force (USPSTF) and the Advisory Committee on Immunization Practices (ACIP).    Exercising to Stay Healthy  To become healthy and stay healthy, it is recommended that you do moderate-intensity and vigorous-intensity exercise. You can tell that you are exercising at a moderate intensity if your heart starts beating faster and you start breathing faster but can still hold a conversation. You can tell that you are exercising at a vigorous intensity if you are breathing much harder and faster and cannot hold a conversation while exercising.  How can exercise benefit me?  Exercising regularly is important. It has many health benefits, such as:  Improving overall fitness, flexibility, and endurance.  Increasing bone density.  Helping with weight control.  Decreasing body fat.  Increasing muscle strength and endurance.  Reducing stress and tension, anxiety, depression, or anger.  Improving overall health.  What guidelines should I follow while exercising?  Before you start a new exercise program, talk with your health care provider.  Do not exercise so much that you hurt yourself, feel dizzy, or get very short of breath.  Wear comfortable clothes and wear shoes  with good support.  Drink plenty of water while you exercise to prevent dehydration or heat stroke.  Work out until your breathing and your heartbeat get faster (moderate intensity).  How often should I exercise?  Choose an activity that you enjoy, and set realistic goals. Your health care provider can help you make an activity plan that is individually designed and works best for you.  Exercise regularly as told by your health care provider. This may include:  Doing strength training two times a week, such as:  Lifting weights.  Using resistance bands.  Push-ups.  Sit-ups.  Yoga.  Doing a certain intensity of exercise for a given amount of time. Choose from these options:  A total of 150 minutes of moderate-intensity exercise every week.  A total of 75 minutes of vigorous-intensity exercise every week.  A mix of moderate-intensity and vigorous-intensity exercise every week.  Children, pregnant women, people who have not exercised regularly, people who are overweight, and older adults may need to talk with a health care provider about what activities are safe to perform. If you have a medical condition, be sure to talk with your health care provider before you start a new exercise program.  What are some exercise ideas?  Moderate-intensity exercise ideas include:  Walking 1 mile (1.6 km) in about 15 minutes.  Biking.  Hiking.  Golfing.  Dancing.  Water aerobics.  Vigorous-intensity exercise ideas include:  Walking 4.5 miles (7.2 km) or more in about 1 hour.  Jogging or running 5 miles (8 km) in about 1 hour.  Biking 10 miles (16.1 km) or more in about 1 hour.  Lap swimming.  Roller-skating or in-line skating.  Cross-country skiing.  Vigorous competitive sports, such as football, basketball, and soccer.  Jumping rope.  Aerobic dancing.  What are some everyday activities that can help me get exercise?  Yard work, such as:  Pushing a .  Raking and bagging leaves.  Washing your car.  Pushing a  stroller.  Shoveling snow.  Gardening.  Washing windows or floors.  How can I be more active in my day-to-day activities?  Use stairs instead of an elevator.  Take a walk during your lunch break.  If you drive, park your car farther away from your work or school.  If you take public transportation, get off one stop early and walk the rest of the way.  Stand up or walk around during all of your indoor phone calls.  Get up, stretch, and walk around every 30 minutes throughout the day.  Enjoy exercise with a friend. Support to continue exercising will help you keep a regular routine of activity.  Where to find more information  You can find more information about exercising to stay healthy from:  [place] Department of Health and Human Services: www.hhs.gov  Centers for Disease Control and Prevention (CDC): www.cdc.gov  Summary  Exercising regularly is important. It will improve your overall fitness, flexibility, and endurance.  Regular exercise will also improve your overall health. It can help you control your weight, reduce stress, and improve your bone density.  Do not exercise so much that you hurt yourself, feel dizzy, or get very short of breath.  Before you start a new exercise program, talk with your health care provider.  This information is not intended to replace advice given to you by your health care provider. Make sure you discuss any questions you have with your health care provider.  Document Revised: 04/15/2022 Document Reviewed: 04/15/2022  [employer] Patient Education © 2024 [employer]      This lists the preventive care services that should be considered, and provides dates of when you are due. Items listed as completed are up-to-date and do not require any further intervention.    Health Maintenance   Topic Date Due    ANNUAL WELLNESS VISIT  08/26/2021    BMI FOLLOWUP  08/26/2021    LIPID PANEL  03/30/2022    COLORECTAL CANCER SCREENING  03/04/2023    ZOSTER VACCINE (1 of 2) 06/06/2024 (Originally  9/26/2001)    COVID-19 Vaccine (1 - 2023-24 season) 06/08/2024 (Originally 9/1/2023)    RSV Vaccine - Adults (1 - 1-dose 60+ series) 06/06/2025 (Originally 9/26/2011)    Pneumococcal Vaccine 65+ (1 of 2 - PCV) 06/06/2025 (Originally 9/26/1956)    INFLUENZA VACCINE  08/01/2024    TDAP/TD VACCINES (2 - Td or Tdap) 03/22/2026    HEPATITIS C SCREENING  Completed       Orders Placed This Encounter   Procedures    Comprehensive Metabolic Panel     Standing Status:   Future     Standing Expiration Date:   6/6/2025     Order Specific Question:   Release to patient     Answer:   Routine Release [[id number]]    Lipid Panel     Standing Status:   Future     Standing Expiration Date:   6/6/2025     Order Specific Question:   Release to patient     Answer:   Routine Release [[id number]]    CK     Standing Status:   Future     Standing Expiration Date:   6/6/2025     Order Specific Question:   Release to patient     Answer:   Routine Release [[id number]]    TSH     Standing Status:   Future     Standing Expiration Date:   6/6/2025     Order Specific Question:   Release to patient     Answer:   Routine Release [[id number]]    Hemoglobin A1c     Standing Status:   Future     Standing Expiration Date:   6/6/2025     Order Specific Question:   Release to patient     Answer:   Routine Release [[id number]]    MicroAlbumin, Urine, Random - Urine, Clean Catch     Standing Status:   Future     Standing Expiration Date:   6/6/2025     Order Specific Question:   Release to patient     Answer:   Routine Release [[id number]]    Uric Acid     Standing Status:   Future     Standing Expiration Date:   6/6/2025     Order Specific Question:   Release to patient     Answer:   Routine Release [[id number]]    Vitamin D,25-Hydroxy     Standing Status:   Future     Standing Expiration Date:   6/6/2025     Order Specific Question:   Release to patient     Answer:   Routine Release [[id number]]    Ambulatory Referral For Screening Colonoscopy  (Gastroenterology)     Referral Priority:   Routine     Referral Type:   Diagnostic Medical     Referral Reason:   Specialty Services Required     Requested Specialty:   Gastroenterology     Number of Visits Requested:   1    CBC & Differential     Standing Status:   Future     Standing Expiration Date:   6/6/2025     Order Specific Question:   Manual Differential     Answer:   No     Order Specific Question:   Release to patient     Answer:   Routine Release [[id number]]       No follow-ups on file.

## 2024-06-06 NOTE — PROGRESS NOTES
The ABCs of the Annual Wellness Visit  Subsequent Medicare Wellness Visit    Subjective    Daniel Pineda is a 73 y.o. male who presents for a Subsequent Medicare Wellness Visit.    The following portions of the patient's history were reviewed and   updated as appropriate: allergies, current medications, past family history, past medical history, past social history, past surgical history, and problem list.    Compared to one year ago, the patient feels his physical   health is the same.    Compared to one year ago, the patient feels his mental   health is the same.    Recent Hospitalizations:  He was not admitted to the hospital during the last year.       Current Medical Providers:  Patient Care Team:  Basilio Rascon MD as PCP - General (Family Medicine)  Jacek, Abby Mijares MD as Consulting Physician (Rheumatology)  Andre Chandler MD as Consulting Physician (Allergy and Immunology)  Rashad Anderson/MD Omer as Surgeon (Orthopedic Surgery)    Outpatient Medications Prior to Visit   Medication Sig Dispense Refill    allopurinol (ZYLOPRIM) 300 MG tablet Take 1 tablet by mouth Daily.      budesonide-formoterol (SYMBICORT) 160-4.5 MCG/ACT inhaler Daily.      Colcrys 0.6 MG tablet 1 tablet. Take 2 tablets, then 1 hour later take one tablet as needed for gout flares      fluticasone (FLONASE) 50 MCG/ACT nasal spray 1 spray into the nostril(s) as directed by provider Daily.      loratadine (CLARITIN) 10 MG tablet Take 1 tablet by mouth Daily.      tamsulosin (FLOMAX) 0.4 MG capsule 24 hr capsule Take 1 capsule by mouth Daily.      clindamycin (CLEOCIN) 300 MG capsule Take 2 pills 1 hour before dental procedure (Patient not taking: Reported on 6/6/2024)      telmisartan (MICARDIS) 40 MG tablet Take 1 tablet by mouth Daily. 10 tablet 0     No facility-administered medications prior to visit.       No opioid medication identified on active medication list. I have reviewed chart for other potential  high risk  "medication/s and harmful drug interactions in the elderly.        Aspirin is not on active medication list.  Aspirin use is not indicated based on review of current medical condition/s. Risk of harm outweighs potential benefits.  .    Patient Active Problem List   Diagnosis    Arthritis of multiple sites    Positional vertigo    Chronic nonseasonal allergic rhinitis due to pollen    Mild intermittent asthma without complication    Idiopathic chronic gout of multiple sites without tophus    Essential hypertension    Mixed hyperlipidemia    Nocturia    Vitamin D deficiency    Impaired fasting glucose    Morales Syndrome    History of supraventricular tachycardia    History of sleep apnea    Paroxysmal SVT (supraventricular tachycardia)    Gastroesophageal reflux disease     Advance Care Planning   Advance Care Planning     Advance Directive is not on file.  ACP discussion was held with the patient during this visit. Patient does not have an advance directive, information provided.     Objective    Vitals:    06/06/24 1350   BP: 138/76   Pulse: 64   SpO2: 98%   Weight: 105 kg (232 lb)   Height: 177.8 cm (70\")     Estimated body mass index is 33.29 kg/m² as calculated from the following:    Height as of this encounter: 177.8 cm (70\").    Weight as of this encounter: 105 kg (232 lb).    BMI is >= 30 and <35. (Class 1 Obesity). The following options were offered after discussion;: weight loss educational material (shared in after visit summary), exercise counseling/recommendations, and nutrition counseling/recommendations      Does the patient have evidence of cognitive impairment? No          HEALTH RISK ASSESSMENT    Smoking Status:  Social History     Tobacco Use   Smoking Status Never   Smokeless Tobacco Never     Alcohol Consumption:  Social History     Substance and Sexual Activity   Alcohol Use Yes    Alcohol/week: 10.0 standard drinks of alcohol    Types: 10 Cans of beer per week     Fall Risk Screen:    PIETRO " Fall Risk Assessment was completed, and patient is at LOW risk for falls.Assessment completed on:2024    Depression Screenin/6/2024     1:53 PM   PHQ-2/PHQ-9 Depression Screening   Little Interest or Pleasure in Doing Things 0-->not at all   Feeling Down, Depressed or Hopeless 0-->not at all   PHQ-9: Brief Depression Severity Measure Score 0       Health Habits and Functional and Cognitive Screenin/6/2024     1:54 PM   Functional & Cognitive Status   Do you have difficulty preparing food and eating? No   Do you have difficulty bathing yourself, getting dressed or grooming yourself? No   Do you have difficulty using the toilet? No   Do you have difficulty moving around from place to place? No   Do you have trouble with steps or getting out of a bed or a chair? No   Current Diet Well Balanced Diet   Dental Exam Up to date   Eye Exam Up to date   Exercise (times per week) 7 times per week   Current Exercises Include Walking   Do you need help using the phone?  No   Are you deaf or do you have serious difficulty hearing?  No   Do you need help to go to places out of walking distance? No   Do you need help shopping? No   Do you need help preparing meals?  No   Do you need help with housework?  No   Do you need help with laundry? No   Do you need help taking your medications? No   Do you need help managing money? No   Do you ever drive or ride in a car without wearing a seat belt? No   Have you felt unusual stress, anger or loneliness in the last month? No   Who do you live with? Spouse   If you need help, do you have trouble finding someone available to you? No   Have you been bothered in the last four weeks by sexual problems? No   Do you have difficulty concentrating, remembering or making decisions? No       Age-appropriate Screening Schedule:  Refer to the list below for future screening recommendations based on patient's age, sex and/or medical conditions. Orders for these recommended tests are  listed in the plan section. The patient has been provided with a written plan.    Health Maintenance   Topic Date Due    Pneumococcal Vaccine 65+ (1 of 2 - PCV) Never done    ZOSTER VACCINE (1 of 2) Never done    RSV Vaccine - Adults (1 - 1-dose 60+ series) Never done    ANNUAL WELLNESS VISIT  08/26/2021    BMI FOLLOWUP  08/26/2021    LIPID PANEL  03/30/2022    COLORECTAL CANCER SCREENING  03/04/2023    COVID-19 Vaccine (1 - 2023-24 season) Never done    INFLUENZA VACCINE  08/01/2024    TDAP/TD VACCINES (2 - Td or Tdap) 03/22/2026    HEPATITIS C SCREENING  Completed                  CMS Preventative Services Quick Reference  Risk Factors Identified During Encounter  Immunizations Discussed/Encouraged: Prevnar 20 (Pneumococcal 20-valent conjugate), Shingrix, COVID19, and RSV (Respiratory Syncytial Virus)  The above risks/problems have been discussed with the patient.  Pertinent information has been shared with the patient in the After Visit Summary.  An After Visit Summary and PPPS were made available to the patient.    Follow Up:   Next Medicare Wellness visit to be scheduled in 1 year.       Additional E&M Note during same encounter follows:  Patient has multiple medical problems which are significant and separately identifiable that require additional work above and beyond the Medicare Wellness Visit.      Chief Complaint  Medicare Wellness-subsequent    Subjective        HPI  Daniel Pineda is also being seen today for an annual adult preventative physical exam. Overall he feels well. Problem list, medication list, and PMFSH have been reviewed. All recent labs(if applicable) and prescription refills(if needed) have been addressed during today's office visit.  The following were discussed during today's preventative wellness exam: Nutrition, Physical activity, Healthy weight, Immunizations, and Screenings   History of Present Illness  The patient is a 73-year-old male who presents for Medicare wellness  "visit.    The patient is currently on a regimen of telmisartan for hypertension, which he tolerates well. He underwent a consultation with Dr. Marmolejo last month, during which blood work was recommended to be conducted at this facility. His hemoglobin A1c levels have not been evaluated. His asthma is well-managed, and he seldom experiences heartburn, managed with Tums. His weight has remained stable at approximately 232 pounds over the past year. His dentist has prescribed clindamycin for dental prophylaxis. His mental state remains stable, with no hospital admissions in the past year. He is not on aspirin therapy.   He does not have a living will.    Review of Systems   Constitutional:  Negative for fatigue.   HENT: Negative.     Eyes: Negative.    Respiratory:  Negative for cough, shortness of breath and wheezing.    Cardiovascular:  Negative for chest pain and palpitations.   Gastrointestinal: Negative.    Genitourinary:  Negative for difficulty urinating.        Nocturia   Musculoskeletal:  Negative for arthralgias.   Skin:  Negative for rash.   Neurological:  Negative for dizziness.   Hematological:  Bruises/bleeds easily.   Psychiatric/Behavioral:  Negative for dysphoric mood. The patient is not nervous/anxious.        Objective   Vital Signs:  /76   Pulse 64   Ht 177.8 cm (70\")   Wt 105 kg (232 lb)   SpO2 98%   BMI 33.29 kg/m²     Physical Exam  Constitutional:       Appearance: Normal appearance. He is well-developed. He is obese.   HENT:      Head: Normocephalic.      Right Ear: Hearing, tympanic membrane and external ear normal.      Left Ear: Hearing, tympanic membrane and external ear normal.   Eyes:      General: Lids are normal.      Conjunctiva/sclera: Conjunctivae normal.      Pupils: Pupils are equal, round, and reactive to light.      Funduscopic exam:     Right eye: No AV nicking or papilledema.         Left eye: No AV nicking or papilledema.   Neck:      Thyroid: No thyroid mass or " thyromegaly.      Vascular: No carotid bruit or JVD.      Trachea: Trachea normal.   Cardiovascular:      Rate and Rhythm: Normal rate and regular rhythm.      Pulses: Normal pulses.      Heart sounds: Normal heart sounds. No murmur heard.  Pulmonary:      Effort: Pulmonary effort is normal.      Breath sounds: Normal breath sounds.   Abdominal:      General: Bowel sounds are normal.      Palpations: Abdomen is soft.      Tenderness: There is no abdominal tenderness.   Musculoskeletal:         General: Normal range of motion.      Cervical back: Normal range of motion and neck supple.      Lumbar back: No bony tenderness.      Right knee: Normal range of motion.      Left knee: Normal range of motion.        Legs:    Lymphadenopathy:      Cervical: No cervical adenopathy.      Upper Body:      Right upper body: No supraclavicular adenopathy.      Left upper body: No supraclavicular adenopathy.   Skin:     General: Skin is warm and dry.      Findings: No rash.      Nails: There is no clubbing.   Neurological:      Mental Status: He is alert and oriented to person, place, and time.      Cranial Nerves: No cranial nerve deficit.      Deep Tendon Reflexes:      Reflex Scores:       Patellar reflexes are 2+ on the right side and 2+ on the left side.  Psychiatric:         Speech: Speech normal.         Behavior: Behavior normal.         Thought Content: Thought content normal.         Judgment: Judgment normal.        Physical Exam            Results               Assessment and Plan   Orders Placed This Encounter   Procedures    Comprehensive Metabolic Panel    Lipid Panel    CK    TSH    Hemoglobin A1c    MicroAlbumin, Urine, Random - Urine, Clean Catch    Uric Acid    Vitamin D,25-Hydroxy    CBC & Differential     New Medications Ordered This Visit   Medications    telmisartan (MICARDIS) 40 MG tablet     Sig: Take 1 tablet by mouth Daily.     Dispense:  90 tablet     Refill:  4     Pt will need to be seen for any  additional refills.     Assessment & Plan  1. Medicare wellness visit/annual adult physical.  The patient's examination yielded satisfactory results. The continuation of exercise and a reduction in caloric content were recommended to aid in weight loss. Immunization needs were discussed, and colon cancer screening is due.    2. Essential hypertension.  The patient's blood pressure is well managed. Continuation of the current therapy is advised.    3. Mixed hyperlipidemia.  The condition is stable.    4. Nocturia.  The patient has not experienced any recent gout attacks. The patient is advised to continue follow-up care under the supervision of urology.    5. Vitamin D deficiency.  Vitamin D levels will be checked within the next month.    6. Impaired fasting glucose.  The condition is stable, pending upcoming labs. Uric acid levels will be checked with the upcoming labs.    7. Colon cancer screening.  A referral to [facility] gastro[facility]ologist has been made.    Follow-up  The patient is scheduled for a follow-up visit with the annual wellness visit and physical in 1 year.       Follow Up   No follow-ups on file.  Patient was given instructions and counseling regarding his condition or for health maintenance advice. Please see specific information pulled into the AVS if appropriate.     Patient or patient representative verbalized consent for the use of Ambient Listening during the visit with  Basilio Rascon MD for chart documentation. 6/6/2024  14:46 EDT

## 2025-02-27 ENCOUNTER — OFFICE VISIT (OUTPATIENT)
Dept: FAMILY MEDICINE CLINIC | Facility: CLINIC | Age: 75
End: 2025-02-27
Payer: MEDICARE

## 2025-02-27 VITALS
WEIGHT: 228 LBS | BODY MASS INDEX: 32.64 KG/M2 | SYSTOLIC BLOOD PRESSURE: 140 MMHG | OXYGEN SATURATION: 98 % | DIASTOLIC BLOOD PRESSURE: 68 MMHG | HEIGHT: 70 IN | HEART RATE: 62 BPM

## 2025-02-27 DIAGNOSIS — Z72.89 OTHER PROBLEMS RELATED TO LIFESTYLE: ICD-10-CM

## 2025-02-27 DIAGNOSIS — R79.89 LFTS ABNORMAL: Primary | ICD-10-CM

## 2025-02-27 NOTE — PROGRESS NOTES
"Chief Complaint  Follow-up (LABS)    Subjective        HPI      The patient presents for evaluation of elevated liver enzymes.    He was recently evaluated by his rheumatologist, Dr. Read, for gout management. During this visit, blood tests revealed elevated liver enzymes. A subsequent test indicated persistent elevation in one of the enzymes. He reports no nausea or abdominal discomfort. He has been consuming alcohol, although he has reduced his intake since Christmas. His current consumption is approximately 3 to 4 beers on Saturdays and Sundays, totaling around 8 beers per week. He has also been using ibuprofen frequently but has recently discontinued its use.    He has been experiencing sciatica, which he attributes to moving [place] decorations, and has been managing the pain with ibuprofen. The pain has since improved.    He is currently on allopurinol 300 mg for gout, which has been effective in controlling his symptoms. He has not experienced a gout attack in over a year. He also has Colcrys available for emergency use but does not take it routinely.    He is on telmisartan for blood pressure management.    He is on tamsulosin for prostate issues.    He is on Symbicort for respiratory issues.    He is on Claritin for allergies.    Supplemental Information  He is not currently taking antibiotics for dental issues but uses them as needed.    SOCIAL HISTORY  - Drinks alcohol: approximately 3-4 beers on weekends, totaling about 8 beers per week    MEDICATIONS  - Allopurinol  - Symbicort  - Telmisartan  - Tamsulosin  - Claritin  - Ibuprofen (recently discontinued)           Vital Signs:   Vitals:    02/27/25 1117   BP: 140/68   Pulse: 62   SpO2: 98%   Weight: 103 kg (228 lb)   Height: 177.8 cm (70\")            2/27/2025    11:22 AM   PHQ-2/PHQ-9 Depression Screening   Little interest or pleasure in doing things Not at all   Feeling down, depressed, or hopeless Not at all                 Physical Exam   "   General Appearance: No Acute Distress, normal appearance, well developed, well nourished.  Vital signs: Vital Signs reviewed.  HEENT: No evidence of icterus in the conjunctiva.  Respiratory: lungs clear to auscultation, no wheezes, normal respiratory effort.  Cardiovascular: Regular rate and rhythm with no murmurs, rubs, or gallop. No carotid bruits auscultated bilaterally.  Gastrointestinal: Abdomen is soft. No guarding, rebound, or masses.  Extremities: No jaundice in the nailbeds.  Skin: Warm and dry, no rash.  Psychiatric: patient cooperative, normal affect.       The following data was reviewed by: Basilio Rascon MD on 02/27/2025:      Results  - Laboratory Studies:    - ALT: 123    - AST: 72    - Bilirubin: 1.3    - Alkaline phosphatase: 117 (normal)                Assessment and Plan      1. Elevated liver enzymes:  (normal 44), AST 72 (normal <40), Bilirubin 1.3, Alkaline phosphatase 117 on 02/07/2025. Potential causes include alcohol consumption and ibuprofen use.  - Abstain from alcohol and ibuprofen.  - Liver ultrasound ordered.  - Repeat lab work in 1 month.    2. Gout: Stable.  Specialist managing condition  - Continue allopurinol 300 mg daily.  - Colcrys available for emergency use.        Follow-up  - Follow up in 1 month.  - Repeat blood test a couple of days before the next appointment.         LFTs abnormal  See above discussion  Orders:    US Liver; Future    Hepatic Function Panel; Future    VIRAL HEPATITIS HBV, HCV; Future       Return in about 1 month (around 3/27/2025) for recheck of current condition.     Patient was given instructions and counseling regarding his condition or for health maintenance advice. Please see specific information pulled into the AVS if appropriate.     Patient or patient representative verbalized consent for the use of Ambient Listening during the visit with  Basilio Rascon MD for chart documentation. 2/27/2025  11:47 EST

## 2025-03-10 ENCOUNTER — HOSPITAL ENCOUNTER (OUTPATIENT)
Dept: ULTRASOUND IMAGING | Facility: HOSPITAL | Age: 75
Discharge: HOME | End: 2025-03-10
Admitting: FAMILY MEDICINE
Payer: MEDICARE

## 2025-03-10 DIAGNOSIS — R79.89 LFTS ABNORMAL: ICD-10-CM

## 2025-03-10 PROCEDURE — 76705 ECHO EXAM OF ABDOMEN: CPT

## 2025-03-20 ENCOUNTER — TELEPHONE (OUTPATIENT)
Dept: FAMILY MEDICINE CLINIC | Facility: CLINIC | Age: 75
End: 2025-03-20
Payer: MEDICARE

## 2025-03-20 NOTE — TELEPHONE ENCOUNTER
Hub staff attempted to follow warm transfer process and was unsuccessful     Caller: Daniel Pineda    Relationship to patient: Self    Best call back number: [phone]     Patient is needing: PATIENT IS REQUESTING A LAB APPOINTMENT.

## 2025-06-10 DIAGNOSIS — I10 ESSENTIAL HYPERTENSION: ICD-10-CM

## 2025-06-11 RX ORDER — TELMISARTAN 40 MG/1
40 TABLET ORAL DAILY
Qty: 90 TABLET | Refills: 3 | OUTPATIENT
Start: 2025-06-11

## 2025-07-21 ENCOUNTER — OFFICE VISIT (OUTPATIENT)
Dept: ORTHOPEDIC SURGERY | Facility: CLINIC | Age: 75
End: 2025-07-21
Payer: MEDICARE

## 2025-07-21 VITALS — TEMPERATURE: 97.7 F | WEIGHT: 217 LBS | HEIGHT: 70 IN | BODY MASS INDEX: 31.07 KG/M2

## 2025-07-21 DIAGNOSIS — R52 PAIN: ICD-10-CM

## 2025-07-21 DIAGNOSIS — M20.42 HAMMER TOE OF LEFT FOOT: ICD-10-CM

## 2025-07-21 DIAGNOSIS — M21.622 BUNIONETTE OF LEFT FOOT: ICD-10-CM

## 2025-07-21 DIAGNOSIS — S90.122A HEMATOMA OF TOE OF LEFT FOOT, INITIAL ENCOUNTER: ICD-10-CM

## 2025-07-21 DIAGNOSIS — S92.412A CLOSED DISPLACED FRACTURE OF PROXIMAL PHALANX OF LEFT GREAT TOE, INITIAL ENCOUNTER: Primary | ICD-10-CM

## 2025-07-21 DIAGNOSIS — M19.072 ARTHRITIS OF LEFT FOOT: ICD-10-CM

## 2025-07-21 PROCEDURE — 99204 OFFICE O/P NEW MOD 45 MIN: CPT | Performed by: ORTHOPAEDIC SURGERY

## 2025-07-21 PROCEDURE — 73630 X-RAY EXAM OF FOOT: CPT | Performed by: ORTHOPAEDIC SURGERY

## 2025-07-21 NOTE — PROGRESS NOTES
New Patient Encounter      Patient: Daniel Pineda  YOB: 1950 74 y.o. male  Medical Record Number: [id number]    Chief Complaints: I hurt my foot    History of Present Illness:        Patient sustained injury to his left foot on 7/19/2025 while walking on the brake pathway tripped.  He had some skin tears evidently to his elbow and forearm and knee as well as bruising and swelling to the foot.  He was subsequently seen in urgent care on 7/20/2025 and found to have comminuted left first proximal phalangeal fracture he was advised to follow-up with Dr. Anderson for his knee and laceration was partially closed and advised to follow-up with me for evaluation of his foot.    Patient is seen today reporting injury as outlined above.  He is actually using a slip on a relatively stiff  shoe rather than the postoperative shoe because it was fairly slick on the bottom and he was afraid he would slip on the brakes again.  Mainly has pain around the forefoot centrally and some to the first toe more so than the lesser toes and has had persistent hammering of the lesser toes mainly the 2nd and 3rd that he thinks may have preexisted this but is unsure.  He has not been using crutches    He works as a  for supplier for [employer] and is retired from [employer].  HPI    Allergies:   Allergies   Allergen Reactions    Citrullus Vulgaris Swelling and Anaphylaxis    Other Anaphylaxis     HALIBUT -patient states no issues with other fish/shellfish     Penicillin G Other (See Comments)     hives    Penicillins Hives    Latex Rash     anaphylactic reaction to halibut       Medications:   Current Outpatient Medications on File Prior to Visit   Medication Sig    allopurinol (ZYLOPRIM) 300 MG tablet Take 1 tablet by mouth Daily.    budesonide-formoterol (SYMBICORT) 160-4.5 MCG/ACT inhaler Daily.    clindamycin (CLEOCIN) 300 MG capsule Take 1 capsule by mouth 4 (Four) Times a Day for 7 days.    Colcrys 0.6 MG  tablet 1 tablet. Take 2 tablets, then 1 hour later take one tablet as needed for gout flares    fluticasone (FLONASE) 50 MCG/ACT nasal spray Administer 1 spray into the nostril(s) as directed by provider Daily.    loratadine (CLARITIN) 10 MG tablet Take 1 tablet by mouth Daily.    saccharomyces boulardii (Florastor) 250 MG capsule Take 1 capsule by mouth 2 (Two) Times a Day.    tamsulosin (FLOMAX) 0.4 MG capsule 24 hr capsule Take 1 capsule by mouth Daily.    telmisartan (MICARDIS) 40 MG tablet Take 1 tablet by mouth Daily.    clindamycin (CLEOCIN) 300 MG capsule Take 2 pills 1 hour before dental procedure (Patient not taking: Reported on 7/21/2025)     No current facility-administered medications on file prior to visit.       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   Tobacco Use    Smoking status: Never    Smokeless tobacco: Never   Vaping Use    Vaping status: Never Used   Substance and Sexual Activity    Alcohol use: Yes     Alcohol/week: 10.0 standard drinks of alcohol     Types: 10 Cans of beer per week    Drug use: No    Sexual activity: Not Currently      Social History     Social History Narrative    Hobbies:billiards     Family History   Problem Relation Age of Onset    Dementia Mother     Heart attack Brother 60    Diabetes type II Brother     Diabetes type I Brother     Prostate cancer Brother        Review of Systems: 14 point review of systems performed, positive pertinent findings identified in HPI. All  "remaining systems negative     Review of Systems      Physical Exam:   Vitals:    07/21/25 1420   Temp: 97.7 °F (36.5 °C)   Weight: 98.4 kg (217 lb)   Height: 177.8 cm (70\")   PainSc: 3      Physical Exam   Constitutional: pleasant, well developed   Eyes: sclera non icteric  Hearing : adequate for exam  Cardiovascular: palpable pulses in left foot, left calf/ thigh NT without sign of DVT  Respiratoy: breathing unlabored   Neurological: grossly sensate to LT throughout left LE  Psychiatric: oriented with normal mood and affect.   Lymphatic: No palpable popliteal lymphadenopathy left LE  Skin: intact throughout left leg/foot  Musculoskeletal: On exam he has moderate swelling to the left forefoot without sign of compartment syndrome.  There is mild tenderness but without gross clinical deformity to the first toe there is hammering of the 2nd and 3rd toes with some ecchymosis.  There is no sign of skin compromise              Physical Exam  Ortho Exam    Radiology: 3 views left foot reviewed on [facility] system from 7/20/2025.  This shows a mildly comminuted slightly angulated fracture of the left first proximal phalanx.  There are some arthritis of the first metatarsal phalangeal joint.  I do not see any gross joint incongruity.  There is some lateral bowing with increased 4/5 IM angle with some lateral exostosis consistent with bunionette.  There is also arthritis of the midfoot at the 2nd and 3rd TMT joints.  There is hammering of the lesser toes especially the 2nd and 3rd without clear evidence of fracture.    3 views of the left foot ordered today fracture alignment after walking on it with a postoperative shoeAnd compared to previous x-rays.  I do not see any appreciable change in alignment compared to previous x-rays of the comminuted fracture of the left first proximal phalanx.  There remains significant arthritis at the midfoot with dorsal spurring and hammering of the lesser toes without clear evidence of " fracture as well as bunionette deformity.    Assessment/Plan: 1.  Left foot comminuted first toe proximal phalangeal fracture   2.  Left midfoot arthritis    3.  Left fifth metatarsal bunionette with lateral exostosis.    We discussed treatment going forward and reviewed with him there is some mild dorsiflexion displacement to the first toe but he does not wish to pursue any surgical treatment unless absolutely necessary which I do not feel that it is    Feel that it would significantly change his long-term outcome.  I do not see any other fractures that could require surgical treatment.  He could eventually require some hammertoe corrections if these are symptomatic    He was somewhat reluctant about the postoperative shoe understandably so given the lateness of the breaks he was fitted with a boot to use instead.  Advised to sleep in the postoperative shoe.  He may still use the sketchers he said intermittently but counseled him to limit this.    We discussed devante wrapping his toes but he said his skin is somewhat fragile and tears easily and I doubt that is going to change his overall prognosis and I do not want to risk skin compromise.    He was counseled avoid anti-inflammatories other than Tylenol so to minimize bone healing in addition and will use ice with skin precautions.

## 2025-08-13 ENCOUNTER — OFFICE VISIT (OUTPATIENT)
Dept: ORTHOPEDIC SURGERY | Facility: CLINIC | Age: 75
End: 2025-08-13
Payer: MEDICARE

## 2025-08-13 VITALS — WEIGHT: 214.8 LBS | HEIGHT: 70 IN | BODY MASS INDEX: 30.75 KG/M2 | TEMPERATURE: 98 F

## 2025-08-13 DIAGNOSIS — S92.412D CLOSED DISPLACED FRACTURE OF PROXIMAL PHALANX OF LEFT GREAT TOE WITH ROUTINE HEALING, SUBSEQUENT ENCOUNTER: ICD-10-CM

## 2025-08-13 DIAGNOSIS — M19.072 ARTHRITIS OF LEFT FOOT: ICD-10-CM

## 2025-08-13 DIAGNOSIS — S81.012D LACERATION OF LEFT KNEE, SUBSEQUENT ENCOUNTER: Primary | ICD-10-CM

## 2025-08-13 DIAGNOSIS — M21.622 BUNIONETTE OF LEFT FOOT: ICD-10-CM

## 2025-08-13 DIAGNOSIS — S90.122D HEMATOMA OF TOE OF LEFT FOOT, SUBSEQUENT ENCOUNTER: ICD-10-CM

## 2025-08-13 DIAGNOSIS — M20.42 HAMMER TOE OF LEFT FOOT: ICD-10-CM

## 2025-08-13 DIAGNOSIS — R52 PAIN: ICD-10-CM

## 2025-08-13 PROBLEM — S90.122A HEMATOMA OF TOE OF LEFT FOOT: Status: ACTIVE | Noted: 2025-08-13

## 2025-08-13 PROBLEM — S81.012A LACERATION OF LEFT KNEE: Status: ACTIVE | Noted: 2025-08-13
